# Patient Record
Sex: MALE | Race: WHITE | NOT HISPANIC OR LATINO | Employment: UNEMPLOYED | URBAN - METROPOLITAN AREA
[De-identification: names, ages, dates, MRNs, and addresses within clinical notes are randomized per-mention and may not be internally consistent; named-entity substitution may affect disease eponyms.]

---

## 2020-01-14 ENCOUNTER — HOSPITAL ENCOUNTER (EMERGENCY)
Facility: HOSPITAL | Age: 40
Discharge: HOME | End: 2020-01-15
Attending: EMERGENCY MEDICINE
Payer: MEDICAID

## 2020-01-14 DIAGNOSIS — F32.A DEPRESSION WITH SUICIDAL IDEATION: Primary | ICD-10-CM

## 2020-01-14 DIAGNOSIS — E16.2 HYPOGLYCEMIA: ICD-10-CM

## 2020-01-14 DIAGNOSIS — R45.851 DEPRESSION WITH SUICIDAL IDEATION: Primary | ICD-10-CM

## 2020-01-14 DIAGNOSIS — E86.0 DEHYDRATION: ICD-10-CM

## 2020-01-14 LAB
ALBUMIN SERPL BCP-MCNC: 4.3 G/DL (ref 3.5–5.2)
ALP SERPL-CCNC: 55 U/L (ref 55–135)
ALT SERPL W/O P-5'-P-CCNC: 28 U/L (ref 10–44)
AMPHET+METHAMPHET UR QL: NEGATIVE
ANION GAP SERPL CALC-SCNC: 12 MMOL/L (ref 8–16)
ANION GAP SERPL CALC-SCNC: 19 MMOL/L (ref 8–16)
APAP SERPL-MCNC: <3 UG/ML (ref 10–20)
AST SERPL-CCNC: 28 U/L (ref 10–40)
BARBITURATES UR QL SCN>200 NG/ML: NEGATIVE
BASOPHILS # BLD AUTO: 0.04 K/UL (ref 0–0.2)
BASOPHILS NFR BLD: 0.7 % (ref 0–1.9)
BENZODIAZ UR QL SCN>200 NG/ML: NEGATIVE
BILIRUB SERPL-MCNC: 0.9 MG/DL (ref 0.1–1)
BILIRUB UR QL STRIP: ABNORMAL
BUN SERPL-MCNC: 12 MG/DL (ref 6–20)
BUN SERPL-MCNC: 13 MG/DL (ref 6–20)
BZE UR QL SCN: NEGATIVE
CALCIUM SERPL-MCNC: 8.5 MG/DL (ref 8.7–10.5)
CALCIUM SERPL-MCNC: 9.5 MG/DL (ref 8.7–10.5)
CANNABINOIDS UR QL SCN: NEGATIVE
CHLORIDE SERPL-SCNC: 100 MMOL/L (ref 95–110)
CHLORIDE SERPL-SCNC: 106 MMOL/L (ref 95–110)
CLARITY UR: CLEAR
CO2 SERPL-SCNC: 16 MMOL/L (ref 23–29)
CO2 SERPL-SCNC: 21 MMOL/L (ref 23–29)
COLOR UR: YELLOW
CREAT SERPL-MCNC: 1 MG/DL (ref 0.5–1.4)
CREAT SERPL-MCNC: 1.2 MG/DL (ref 0.5–1.4)
CREAT UR-MCNC: 177.8 MG/DL (ref 23–375)
DIFFERENTIAL METHOD: ABNORMAL
EOSINOPHIL # BLD AUTO: 0.2 K/UL (ref 0–0.5)
EOSINOPHIL NFR BLD: 3.1 % (ref 0–8)
ERYTHROCYTE [DISTWIDTH] IN BLOOD BY AUTOMATED COUNT: 18.3 % (ref 11.5–14.5)
EST. GFR  (AFRICAN AMERICAN): >60 ML/MIN/1.73 M^2
EST. GFR  (AFRICAN AMERICAN): >60 ML/MIN/1.73 M^2
EST. GFR  (NON AFRICAN AMERICAN): >60 ML/MIN/1.73 M^2
EST. GFR  (NON AFRICAN AMERICAN): >60 ML/MIN/1.73 M^2
ETHANOL SERPL-MCNC: <10 MG/DL
GLUCOSE SERPL-MCNC: 57 MG/DL (ref 70–110)
GLUCOSE SERPL-MCNC: 72 MG/DL (ref 70–110)
GLUCOSE UR QL STRIP: NEGATIVE
HCT VFR BLD AUTO: 38.4 % (ref 40–54)
HGB BLD-MCNC: 10.9 G/DL (ref 14–18)
HGB UR QL STRIP: NEGATIVE
IMM GRANULOCYTES # BLD AUTO: 0.02 K/UL (ref 0–0.04)
IMM GRANULOCYTES NFR BLD AUTO: 0.4 % (ref 0–0.5)
KETONES UR QL STRIP: ABNORMAL
LEUKOCYTE ESTERASE UR QL STRIP: NEGATIVE
LYMPHOCYTES # BLD AUTO: 1.2 K/UL (ref 1–4.8)
LYMPHOCYTES NFR BLD: 22.6 % (ref 18–48)
MCH RBC QN AUTO: 21.4 PG (ref 27–31)
MCHC RBC AUTO-ENTMCNC: 28.4 G/DL (ref 32–36)
MCV RBC AUTO: 75 FL (ref 82–98)
METHADONE UR QL SCN>300 NG/ML: NEGATIVE
MONOCYTES # BLD AUTO: 0.4 K/UL (ref 0.3–1)
MONOCYTES NFR BLD: 7 % (ref 4–15)
NEUTROPHILS # BLD AUTO: 3.6 K/UL (ref 1.8–7.7)
NEUTROPHILS NFR BLD: 66.2 % (ref 38–73)
NITRITE UR QL STRIP: NEGATIVE
NRBC BLD-RTO: 0 /100 WBC
OPIATES UR QL SCN: NEGATIVE
PCP UR QL SCN>25 NG/ML: NEGATIVE
PH UR STRIP: 6 [PH] (ref 5–8)
PLATELET # BLD AUTO: 399 K/UL (ref 150–350)
PMV BLD AUTO: 9.1 FL (ref 9.2–12.9)
POTASSIUM SERPL-SCNC: 3.6 MMOL/L (ref 3.5–5.1)
POTASSIUM SERPL-SCNC: 3.6 MMOL/L (ref 3.5–5.1)
PROT SERPL-MCNC: 7.6 G/DL (ref 6–8.4)
PROT UR QL STRIP: NEGATIVE
RBC # BLD AUTO: 5.1 M/UL (ref 4.6–6.2)
SODIUM SERPL-SCNC: 135 MMOL/L (ref 136–145)
SODIUM SERPL-SCNC: 139 MMOL/L (ref 136–145)
SP GR UR STRIP: >=1.03 (ref 1–1.03)
TOXICOLOGY INFORMATION: NORMAL
TSH SERPL DL<=0.005 MIU/L-ACNC: 0.92 UIU/ML (ref 0.4–4)
URN SPEC COLLECT METH UR: ABNORMAL
UROBILINOGEN UR STRIP-ACNC: NEGATIVE EU/DL
WBC # BLD AUTO: 5.45 K/UL (ref 3.9–12.7)

## 2020-01-14 PROCEDURE — 96361 HYDRATE IV INFUSION ADD-ON: CPT

## 2020-01-14 PROCEDURE — 81003 URINALYSIS AUTO W/O SCOPE: CPT | Mod: 59

## 2020-01-14 PROCEDURE — 80329 ANALGESICS NON-OPIOID 1 OR 2: CPT

## 2020-01-14 PROCEDURE — 80053 COMPREHEN METABOLIC PANEL: CPT

## 2020-01-14 PROCEDURE — 80320 DRUG SCREEN QUANTALCOHOLS: CPT

## 2020-01-14 PROCEDURE — S4991 NICOTINE PATCH NONLEGEND: HCPCS | Performed by: EMERGENCY MEDICINE

## 2020-01-14 PROCEDURE — 96360 HYDRATION IV INFUSION INIT: CPT

## 2020-01-14 PROCEDURE — 82962 GLUCOSE BLOOD TEST: CPT

## 2020-01-14 PROCEDURE — 63600175 PHARM REV CODE 636 W HCPCS: Performed by: EMERGENCY MEDICINE

## 2020-01-14 PROCEDURE — 25000003 PHARM REV CODE 250: Performed by: EMERGENCY MEDICINE

## 2020-01-14 PROCEDURE — 80048 BASIC METABOLIC PNL TOTAL CA: CPT

## 2020-01-14 PROCEDURE — 80307 DRUG TEST PRSMV CHEM ANLYZR: CPT

## 2020-01-14 PROCEDURE — 84443 ASSAY THYROID STIM HORMONE: CPT

## 2020-01-14 PROCEDURE — 99285 EMERGENCY DEPT VISIT HI MDM: CPT | Mod: 25

## 2020-01-14 PROCEDURE — 85025 COMPLETE CBC W/AUTO DIFF WBC: CPT

## 2020-01-14 RX ORDER — QUETIAPINE FUMARATE 200 MG/1
200 TABLET, FILM COATED ORAL NIGHTLY
Status: ON HOLD | COMMUNITY
Start: 2019-10-07 | End: 2020-02-03 | Stop reason: HOSPADM

## 2020-01-14 RX ORDER — IBUPROFEN 200 MG
1 TABLET ORAL
Status: DISCONTINUED | OUTPATIENT
Start: 2020-01-14 | End: 2020-01-15 | Stop reason: HOSPADM

## 2020-01-14 RX ORDER — BUPROPION HYDROCHLORIDE 150 MG/1
150 TABLET ORAL DAILY
Status: ON HOLD | COMMUNITY
Start: 2019-10-07 | End: 2020-02-03 | Stop reason: HOSPADM

## 2020-01-14 RX ORDER — TRAZODONE HYDROCHLORIDE 100 MG/1
100 TABLET ORAL NIGHTLY
COMMUNITY
Start: 2019-10-07 | End: 2022-11-29

## 2020-01-14 RX ORDER — BUSPIRONE HYDROCHLORIDE 30 MG/1
30 TABLET ORAL 3 TIMES DAILY
Status: ON HOLD | COMMUNITY
Start: 2019-10-07 | End: 2020-02-03 | Stop reason: HOSPADM

## 2020-01-14 RX ORDER — DIPHENHYDRAMINE HCL 50 MG
50 CAPSULE ORAL
Status: COMPLETED | OUTPATIENT
Start: 2020-01-14 | End: 2020-01-14

## 2020-01-14 RX ORDER — GABAPENTIN 300 MG/1
300 CAPSULE ORAL DAILY
COMMUNITY
Start: 2019-10-07 | End: 2020-01-14

## 2020-01-14 RX ADMIN — SODIUM CHLORIDE 1000 ML: 0.9 INJECTION, SOLUTION INTRAVENOUS at 05:01

## 2020-01-14 RX ADMIN — NICOTINE 1 PATCH: 21 PATCH TRANSDERMAL at 07:01

## 2020-01-14 RX ADMIN — DIPHENHYDRAMINE HYDROCHLORIDE 50 MG: 50 CAPSULE ORAL at 10:01

## 2020-01-14 NOTE — ED NOTES
Patient belongings:    Bag 1: red phone , 2 lighters, pocket knife (security aware), portable , black wallet, gray pants, brown shoes, tennis shoes red jacket  Patient belongings in [facility] locker 28

## 2020-01-14 NOTE — ED NOTES
Pt c/o SI/HI related auditory hallucinations x4-5 days.    Patient identifiers verified and correct for Adal Bower.    LOC: The patient is awake, alert and aware of environment with an appropriate affect, the patient is oriented x 3 and speaking appropriately.  APPEARANCE: Patient resting comfortably and in no acute distress, patient is clean and well groomed, patient's clothing is properly fastened.  SKIN: The skin is warm and dry, color consistent with ethnicity, patient has normal skin turgor and moist mucus membranes, skin intact, no breakdown or bruising noted. Significant scaring noted to bilateral upper exremities  MUSCULOSKELETAL: Patient moving all extremities spontaneously.  RESPIRATORY: Airway is open and patent, respirations are spontaneous.  CARDIAC: Patient has a normal rate, no peripheral edema noted, capillary refill < 3 seconds.  ABDOMEN: Soft and non tender to palpation.

## 2020-01-14 NOTE — ED NOTES
Pt reports he attempted suicide back in October by slitting his wrist. Pt is open to discussing his situation - pt reports he has been sleeping in a tent for the last 2 years. Pt is calm and cooperative at this time.

## 2020-01-15 VITALS
OXYGEN SATURATION: 98 % | TEMPERATURE: 98 F | WEIGHT: 165 LBS | RESPIRATION RATE: 18 BRPM | SYSTOLIC BLOOD PRESSURE: 127 MMHG | DIASTOLIC BLOOD PRESSURE: 75 MMHG | HEIGHT: 72 IN | BODY MASS INDEX: 22.35 KG/M2 | HEART RATE: 75 BPM

## 2020-01-15 LAB — POCT GLUCOSE: 85 MG/DL (ref 70–110)

## 2020-01-15 NOTE — ED NOTES
Pt resting in bed. No acute distress. RR equal and non-labored, VSS. Bed in low and locked position. Pt's room secured per protocol. Pt's belongings secured and pt placed in grey gown and yellow socks.  Pt being directly monitored by elsa Snowden at this time.     Will continue to monitor

## 2020-01-15 NOTE — ED PROVIDER NOTES
"SCRIBE #1 NOTE: I, Yo Gonzales, am scribing for, and in the presence of, Christophe Aguilar Do, MD. I have scribed the entire note.       History     Chief Complaint   Patient presents with    Psychiatric Evaluation     Si and auditory hallucination     Review of patient's allergies indicates:  No Known Allergies      History of Present Illness     HPI    1/14/2020, 6:09 PM  History obtained from the patient      History of Present Illness: Adal Bower is a 39 y.o. male patient with a PMHx of anxiety and depression who presents to the Emergency Department for evaluation of psychiatric evaluation. Pt reports having a recent increase in stress as his friend is betraying him. Pt admits to HI towards his friend. Pt also admits to SI, with a plan of cutting himself. Pt states that he lives in the backyard of a friends home. Pt was d/c from a psychiatric facility on 1/6/20. Pt states "I can't think straight because of lack of nutrition, lack of food, and lack of sleep". Symptoms are constant and moderate in severity. No mitigating or exacerbating factors reported. Patient denies any IV drug use, abd pain, n/v/d, fever, chills, alcohol use, weakness, cough, CP, SOB, HA, dizziness, auditory/visual hallucinations, and all other sxs at this time. No prior Tx reported. No further complaints or concerns at this time.         Arrival mode: EMS      PCP: Primary Doctor No        Past Medical History:  Past Medical History:   Diagnosis Date    Anxiety     Depression        Past Surgical History:  History reviewed. No pertinent surgical history.      Family History:  History reviewed. No pertinent family history.    Social History:  Social History     Tobacco Use    Smoking status: Current Every Day Smoker     Packs/day: 0.50   Substance and Sexual Activity    Alcohol use: Not Currently    Drug use: Yes     Types: Marijuana    Sexual activity: Unknown        Review of Systems     Review of Systems "   Constitutional: Negative for chills and fever.        (-) IV drug use  (-) alcohol use   HENT: Negative for sore throat.    Respiratory: Negative for cough and shortness of breath.    Cardiovascular: Negative for chest pain.   Gastrointestinal: Negative for abdominal pain, diarrhea, nausea and vomiting.   Genitourinary: Negative for dysuria.   Musculoskeletal: Negative for back pain.   Skin: Negative for rash.   Neurological: Negative for dizziness, weakness and headaches.   Hematological: Does not bruise/bleed easily.   Psychiatric/Behavioral: Positive for sleep disturbance and suicidal ideas. Negative for hallucinations (auditory/visual).        (+) homicidal ideation   All other systems reviewed and are negative.     Physical Exam     Initial Vitals [01/14/20 1607]   BP Pulse Resp Temp SpO2   130/77 83 18 98.2 °F (36.8 °C) 99 %      MAP       --          Physical Exam  Nursing Notes and Vital Signs Reviewed.  Constitutional: Patient is in no apparent distress. Well-developed and well-nourished.  Head: Atraumatic. Normocephalic.  Eyes: PERRL. EOM intact. Conjunctivae are not pale. No scleral icterus.  ENT: Mucous membranes are moist. Oropharynx is clear and symmetric.    Neck: Supple. Full ROM. No lymphadenopathy.  Cardiovascular: Regular rate. Regular rhythm. No murmurs, rubs, or gallops. Distal pulses are 2+ and symmetric.  Pulmonary/Chest: No respiratory distress. Clear to auscultation bilaterally. No wheezing or rales.  Abdominal: Soft and non-distended.  There is no tenderness.  No rebound, guarding, or rigidity. Good bowel sounds.  Musculoskeletal: Moves all extremities. No obvious deformities. No edema.   Skin: Warm and dry.  Neurological:  Alert, awake, and appropriate.  Normal speech.  No acute focal neurological deficits are appreciated.  Psychiatric:               Behavior: psychomotor agitation              Mood and Affect: flat affect              Thought Process: scattered              Suicidal  Ideations: Yes              Suicidal Plan: Specific plan to harm self.              Homicidal Ideations: Yes              Hallucinations: none       ED Course   Procedures  ED Vital Signs:  Vitals:    01/14/20 1607 01/14/20 1948   BP: 130/77 119/64   Pulse: 83 84   Resp: 18 18   Temp: 98.2 °F (36.8 °C) 98 °F (36.7 °C)   TempSrc: Oral Oral   SpO2: 99% 99%   Weight: 74.8 kg (165 lb)    Height: 6' (1.829 m)        Abnormal Lab Results:  Labs Reviewed   CBC W/ AUTO DIFFERENTIAL - Abnormal; Notable for the following components:       Result Value    Hemoglobin 10.9 (*)     Hematocrit 38.4 (*)     Mean Corpuscular Volume 75 (*)     Mean Corpuscular Hemoglobin 21.4 (*)     Mean Corpuscular Hemoglobin Conc 28.4 (*)     RDW 18.3 (*)     Platelets 399 (*)     MPV 9.1 (*)     All other components within normal limits   COMPREHENSIVE METABOLIC PANEL - Abnormal; Notable for the following components:    Sodium 135 (*)     CO2 16 (*)     Glucose 57 (*)     Anion Gap 19 (*)     All other components within normal limits   URINALYSIS, REFLEX TO URINE CULTURE - Abnormal; Notable for the following components:    Specific Gravity, UA >=1.030 (*)     Ketones, UA 3+ (*)     Bilirubin (UA) 1+ (*)     All other components within normal limits    Narrative:     Preferred Collection Type->Urine, Clean Catch   ACETAMINOPHEN LEVEL - Abnormal; Notable for the following components:    Acetaminophen (Tylenol), Serum <3.0 (*)     All other components within normal limits   BASIC METABOLIC PANEL - Abnormal; Notable for the following components:    CO2 21 (*)     Calcium 8.5 (*)     All other components within normal limits    Narrative:     After fluids complete   TSH   DRUG SCREEN PANEL, URINE EMERGENCY    Narrative:     Preferred Collection Type->Urine, Clean Catch   ALCOHOL,MEDICAL (ETHANOL)        All Lab Results:  Results for orders placed or performed during the hospital encounter of 01/14/20   CBC auto differential   Result Value Ref Range     WBC 5.45 3.90 - 12.70 K/uL    RBC 5.10 4.60 - 6.20 M/uL    Hemoglobin 10.9 (L) 14.0 - 18.0 g/dL    Hematocrit 38.4 (L) 40.0 - 54.0 %    Mean Corpuscular Volume 75 (L) 82 - 98 fL    Mean Corpuscular Hemoglobin 21.4 (L) 27.0 - 31.0 pg    Mean Corpuscular Hemoglobin Conc 28.4 (L) 32.0 - 36.0 g/dL    RDW 18.3 (H) 11.5 - 14.5 %    Platelets 399 (H) 150 - 350 K/uL    MPV 9.1 (L) 9.2 - 12.9 fL    Immature Granulocytes 0.4 0.0 - 0.5 %    Gran # (ANC) 3.6 1.8 - 7.7 K/uL    Immature Grans (Abs) 0.02 0.00 - 0.04 K/uL    Lymph # 1.2 1.0 - 4.8 K/uL    Mono # 0.4 0.3 - 1.0 K/uL    Eos # 0.2 0.0 - 0.5 K/uL    Baso # 0.04 0.00 - 0.20 K/uL    nRBC 0 0 /100 WBC    Gran% 66.2 38.0 - 73.0 %    Lymph% 22.6 18.0 - 48.0 %    Mono% 7.0 4.0 - 15.0 %    Eosinophil% 3.1 0.0 - 8.0 %    Basophil% 0.7 0.0 - 1.9 %    Differential Method Automated    Comprehensive metabolic panel   Result Value Ref Range    Sodium 135 (L) 136 - 145 mmol/L    Potassium 3.6 3.5 - 5.1 mmol/L    Chloride 100 95 - 110 mmol/L    CO2 16 (L) 23 - 29 mmol/L    Glucose 57 (L) 70 - 110 mg/dL    BUN, Bld 12 6 - 20 mg/dL    Creatinine 1.2 0.5 - 1.4 mg/dL    Calcium 9.5 8.7 - 10.5 mg/dL    Total Protein 7.6 6.0 - 8.4 g/dL    Albumin 4.3 3.5 - 5.2 g/dL    Total Bilirubin 0.9 0.1 - 1.0 mg/dL    Alkaline Phosphatase 55 55 - 135 U/L    AST 28 10 - 40 U/L    ALT 28 10 - 44 U/L    Anion Gap 19 (H) 8 - 16 mmol/L    eGFR if African American >60 >60 mL/min/1.73 m^2    eGFR if non African American >60 >60 mL/min/1.73 m^2   TSH   Result Value Ref Range    TSH 0.917 0.400 - 4.000 uIU/mL   Urinalysis, Reflex to Urine Culture Urine, Clean Catch   Result Value Ref Range    Specimen UA Urine, Clean Catch     Color, UA Yellow Yellow, Straw, Paulina    Appearance, UA Clear Clear    pH, UA 6.0 5.0 - 8.0    Specific Gravity, UA >=1.030 (A) 1.005 - 1.030    Protein, UA Negative Negative    Glucose, UA Negative Negative    Ketones, UA 3+ (A) Negative    Bilirubin (UA) 1+ (A) Negative    Occult Blood  UA Negative Negative    Nitrite, UA Negative Negative    Urobilinogen, UA Negative <2.0 EU/dL    Leukocytes, UA Negative Negative   Drug screen panel, emergency   Result Value Ref Range    Benzodiazepines Negative     Methadone metabolites Negative     Cocaine (Metab.) Negative     Opiate Scrn, Ur Negative     Barbiturate Screen, Ur Negative     Amphetamine Screen, Ur Negative     THC Negative     Phencyclidine Negative     Creatinine, Random Ur 177.8 23.0 - 375.0 mg/dL    Toxicology Information SEE COMMENT    Ethanol   Result Value Ref Range    Alcohol, Medical, Serum <10 <10 mg/dL   Acetaminophen level   Result Value Ref Range    Acetaminophen (Tylenol), Serum <3.0 (L) 10.0 - 20.0 ug/mL   Basic metabolic panel   Result Value Ref Range    Sodium 139 136 - 145 mmol/L    Potassium 3.6 3.5 - 5.1 mmol/L    Chloride 106 95 - 110 mmol/L    CO2 21 (L) 23 - 29 mmol/L    Glucose 72 70 - 110 mg/dL    BUN, Bld 13 6 - 20 mg/dL    Creatinine 1.0 0.5 - 1.4 mg/dL    Calcium 8.5 (L) 8.7 - 10.5 mg/dL    Anion Gap 12 8 - 16 mmol/L    eGFR if African American >60 >60 mL/min/1.73 m^2    eGFR if non African American >60 >60 mL/min/1.73 m^2         Imaging Results:  Imaging Results    None                   The Emergency Provider reviewed the vital signs and test results, which are outlined above.     ED Discussion   6:20 PM: The [facility] hold has been issued by Dr. Peña at this time for SI. Initial glucose very low and pt ate in ER.  Also received 2 liters of fluids for low CO2. Suspect he was dehydrated.  Repeat was almost normal.      8:38 PM: Pt has been medically cleared by Dr. Peña at this time. Reassessed pt at this time. Pt is resting comfortably and appears in no acute distress. There are no psychiatric services offered at this facility. D/w pt all pertinent ED information and plan to transfer to psychiatric facility for psychiatric treatment. Pt verbalizes understanding. Patient being transferred by [facility] for ongoing personal  protection en route. Pt has been made aware of all risks and benefits associated with transfer, including but not limited to death, MVC, loss of vital signs, and/or permanent disability. Benefits include ability to be treated at an inpatient psychiatric facility. Pt will be transported by personnel trained in CPR and CPI. Patient understands that there could be unforeseen motor vehicle accidents, inclement weather, or loss of vital signs that could result in potential death or permanent disability. All questions and complaints have been addressed at this time. Pt condition is stable at this time and is clear to transfer to psychiatric facility at this time.     11:51 PM: Consult with Dr. Ontiveros (Psychiatry) at [facility] concerning pt. There are no psychiatric services, which the patient requires, offered at [facility] at this time. Dr. Ontiveros expresses understanding and will accept transfer for SI.  Accepting Facility: [facility]  Accepting Physician: Dr. Ontiveros    11:52 PM: Re-evaluated pt. Informed pt and family that there are no psychiatric services available at this time. I have discussed test results, shared treatment plan, and the need for transfer with patient and family at bedside. All historical, clinical, radiographic, and laboratory findings were reviewed with the patient/family in detail. Patient will be transferred by [facility] services with care required en route. Patient understands that there could be unforeseen motor vehicle accidents or loss of vital signs that could result in potential death or permanent disability. Pt and family express understanding at this time and agree with all information. All questions answered. Pt and family have no further questions or concerns at this time. Pt is ready for transfer.            Medical Decision Making:   Clinical Tests:   Lab Tests: Ordered and Reviewed     Additional MDM:   Smoking Cessation: The patient was referred to a tobacco  treatment program. The patient was counseled on the adverse effects of second hand smoke. Appropriate patient literature was given to the patient concerning tobacco cessation. The patient was counseled on how exercise will aide in tobacco cessation. The patient was counseled on tobacco related  health complications. The patient was counseled on hazards of smoking while driving.        ED Medication(s):  Medications   nicotine 21 mg/24 hr 1 patch (1 patch Transdermal Patch Applied 1/14/20 1925)   sodium chloride 0.9% bolus 1,000 mL (0 mLs Intravenous Stopped 1/14/20 1929)   sodium chloride 0.9% bolus 1,000 mL (0 mLs Intravenous Stopped 1/14/20 1929)   diphenhydrAMINE capsule 50 mg (50 mg Oral Given 1/14/20 2244)       New Prescriptions    No medications on file               Scribe Attestation:   Scribe #1: I performed the above scribed service and the documentation accurately describes the services I performed. I attest to the accuracy of the note.     Attending:   Physician Attestation Statement for Scribe #1: I, Christophe Aguilar Do, MD, personally performed the services described in this documentation, as scribed by Yo Gonzales, in my presence, and it is both accurate and complete.           Clinical Impression       ICD-10-CM ICD-9-CM   1. Depression with suicidal ideation F32.9 311    R45.851 V62.84       Disposition:   Disposition: Transferred  Condition: Fair         Christophe Aguilar Do, MD  01/15/20 0132       Christophe Aguilar Do, MD  01/15/20 0133

## 2020-01-15 NOTE — ED NOTES
Patient lying in bed. NAD noted. RR even and unlabored. Pt oriented x 4. Pt remains in gray gown/yellow socks. Pt remains free of all harmful objects. pts belongings obtained by previous RN and listed in chart, labeled, and secured in cabinet 28. Pts room remains cleared per P[facility] protocol/safe environment checklist. Suicide risk assessment completed. Toilet/food offered. Reports he is feeling better after receiving IV fluids. He states he is no longer feeling like he is hallucinating.elsa Snowden, remains at bedside performing direct observation. Pt denies any needs at this time. Bed in low locked position, side rails up x 2, pt instructed to ask sitter if assistance is needed, will continue to monitor.

## 2020-01-25 ENCOUNTER — HOSPITAL ENCOUNTER (OUTPATIENT)
Facility: HOSPITAL | Age: 40
Discharge: PSYCHIATRIC OR SUBSTANCE USE FACILITY | End: 2020-01-26
Attending: EMERGENCY MEDICINE | Admitting: INTERNAL MEDICINE
Payer: MEDICAID

## 2020-01-25 DIAGNOSIS — F10.939 ALCOHOL WITHDRAWAL SYNDROME WITH COMPLICATION: ICD-10-CM

## 2020-01-25 DIAGNOSIS — E87.20 LACTIC ACIDOSIS: ICD-10-CM

## 2020-01-25 DIAGNOSIS — E86.1 INTRAVASCULAR VOLUME DEPLETION: ICD-10-CM

## 2020-01-25 DIAGNOSIS — R00.0 TACHYCARDIA: ICD-10-CM

## 2020-01-25 DIAGNOSIS — R50.9 FEVER: Primary | ICD-10-CM

## 2020-01-25 DIAGNOSIS — R45.851 SUICIDAL IDEATION: ICD-10-CM

## 2020-01-25 LAB
ALBUMIN SERPL BCP-MCNC: 4.1 G/DL (ref 3.5–5.2)
ALP SERPL-CCNC: 59 U/L (ref 55–135)
ALT SERPL W/O P-5'-P-CCNC: 37 U/L (ref 10–44)
ANION GAP SERPL CALC-SCNC: 14 MMOL/L (ref 8–16)
AST SERPL-CCNC: 41 U/L (ref 10–40)
BASOPHILS # BLD AUTO: 0.01 K/UL (ref 0–0.2)
BASOPHILS NFR BLD: 0.2 % (ref 0–1.9)
BILIRUB SERPL-MCNC: 0.6 MG/DL (ref 0.1–1)
BILIRUB UR QL STRIP: NEGATIVE
BUN SERPL-MCNC: 15 MG/DL (ref 6–20)
CALCIUM SERPL-MCNC: 9.3 MG/DL (ref 8.7–10.5)
CHLORIDE SERPL-SCNC: 100 MMOL/L (ref 95–110)
CLARITY UR: CLEAR
CO2 SERPL-SCNC: 23 MMOL/L (ref 23–29)
COLOR UR: YELLOW
CREAT SERPL-MCNC: 1 MG/DL (ref 0.5–1.4)
DIFFERENTIAL METHOD: ABNORMAL
EOSINOPHIL # BLD AUTO: 0 K/UL (ref 0–0.5)
EOSINOPHIL NFR BLD: 0.2 % (ref 0–8)
ERYTHROCYTE [DISTWIDTH] IN BLOOD BY AUTOMATED COUNT: 22.7 % (ref 11.5–14.5)
EST. GFR  (AFRICAN AMERICAN): >60 ML/MIN/1.73 M^2
EST. GFR  (NON AFRICAN AMERICAN): >60 ML/MIN/1.73 M^2
GLUCOSE SERPL-MCNC: 108 MG/DL (ref 70–110)
GLUCOSE UR QL STRIP: NEGATIVE
HCT VFR BLD AUTO: 34.4 % (ref 40–54)
HGB BLD-MCNC: 10.3 G/DL (ref 14–18)
HGB UR QL STRIP: NEGATIVE
HIV 1+2 AB+HIV1 P24 AG SERPL QL IA: NEGATIVE
IMM GRANULOCYTES # BLD AUTO: 0.02 K/UL (ref 0–0.04)
IMM GRANULOCYTES NFR BLD AUTO: 0.3 % (ref 0–0.5)
INFLUENZA A, MOLECULAR: NEGATIVE
INFLUENZA B, MOLECULAR: NEGATIVE
KETONES UR QL STRIP: NEGATIVE
LACTATE SERPL-SCNC: 3.1 MMOL/L (ref 0.5–2.2)
LEUKOCYTE ESTERASE UR QL STRIP: NEGATIVE
LYMPHOCYTES # BLD AUTO: 0.4 K/UL (ref 1–4.8)
LYMPHOCYTES NFR BLD: 5.8 % (ref 18–48)
MCH RBC QN AUTO: 22.3 PG (ref 27–31)
MCHC RBC AUTO-ENTMCNC: 29.9 G/DL (ref 32–36)
MCV RBC AUTO: 75 FL (ref 82–98)
MONOCYTES # BLD AUTO: 0.5 K/UL (ref 0.3–1)
MONOCYTES NFR BLD: 8.1 % (ref 4–15)
NEUTROPHILS # BLD AUTO: 5.2 K/UL (ref 1.8–7.7)
NEUTROPHILS NFR BLD: 85.4 % (ref 38–73)
NITRITE UR QL STRIP: NEGATIVE
NRBC BLD-RTO: 0 /100 WBC
PH UR STRIP: 6 [PH] (ref 5–8)
PLATELET # BLD AUTO: 248 K/UL (ref 150–350)
PMV BLD AUTO: 8.9 FL (ref 9.2–12.9)
POTASSIUM SERPL-SCNC: 3.4 MMOL/L (ref 3.5–5.1)
PROCALCITONIN SERPL IA-MCNC: 0.06 NG/ML
PROT SERPL-MCNC: 7.1 G/DL (ref 6–8.4)
PROT UR QL STRIP: ABNORMAL
RBC # BLD AUTO: 4.61 M/UL (ref 4.6–6.2)
SODIUM SERPL-SCNC: 137 MMOL/L (ref 136–145)
SP GR UR STRIP: >=1.03 (ref 1–1.03)
SPECIMEN SOURCE: NORMAL
TROPONIN I SERPL DL<=0.01 NG/ML-MCNC: 0.01 NG/ML (ref 0–0.03)
URN SPEC COLLECT METH UR: ABNORMAL
UROBILINOGEN UR STRIP-ACNC: NEGATIVE EU/DL
WBC # BLD AUTO: 6.08 K/UL (ref 3.9–12.7)

## 2020-01-25 PROCEDURE — 86308 HETEROPHILE ANTIBODY SCREEN: CPT

## 2020-01-25 PROCEDURE — 80053 COMPREHEN METABOLIC PANEL: CPT

## 2020-01-25 PROCEDURE — 93010 ELECTROCARDIOGRAM REPORT: CPT | Mod: ,,, | Performed by: INTERNAL MEDICINE

## 2020-01-25 PROCEDURE — 36415 COLL VENOUS BLD VENIPUNCTURE: CPT

## 2020-01-25 PROCEDURE — 93010 EKG 12-LEAD: ICD-10-PCS | Mod: ,,, | Performed by: INTERNAL MEDICINE

## 2020-01-25 PROCEDURE — 25000242 PHARM REV CODE 250 ALT 637 W/ HCPCS: Performed by: EMERGENCY MEDICINE

## 2020-01-25 PROCEDURE — 84484 ASSAY OF TROPONIN QUANT: CPT

## 2020-01-25 PROCEDURE — 99291 CRITICAL CARE FIRST HOUR: CPT | Mod: 25

## 2020-01-25 PROCEDURE — 25000003 PHARM REV CODE 250: Performed by: EMERGENCY MEDICINE

## 2020-01-25 PROCEDURE — 96375 TX/PRO/DX INJ NEW DRUG ADDON: CPT

## 2020-01-25 PROCEDURE — 85025 COMPLETE CBC W/AUTO DIFF WBC: CPT

## 2020-01-25 PROCEDURE — 81003 URINALYSIS AUTO W/O SCOPE: CPT

## 2020-01-25 PROCEDURE — 86703 HIV-1/HIV-2 1 RESULT ANTBDY: CPT

## 2020-01-25 PROCEDURE — 93005 ELECTROCARDIOGRAM TRACING: CPT

## 2020-01-25 PROCEDURE — 87502 INFLUENZA DNA AMP PROBE: CPT

## 2020-01-25 PROCEDURE — 83605 ASSAY OF LACTIC ACID: CPT

## 2020-01-25 PROCEDURE — 94640 AIRWAY INHALATION TREATMENT: CPT

## 2020-01-25 PROCEDURE — 96361 HYDRATE IV INFUSION ADD-ON: CPT

## 2020-01-25 PROCEDURE — 84145 PROCALCITONIN (PCT): CPT

## 2020-01-25 PROCEDURE — 87040 BLOOD CULTURE FOR BACTERIA: CPT | Mod: 59

## 2020-01-25 PROCEDURE — 63600175 PHARM REV CODE 636 W HCPCS: Performed by: EMERGENCY MEDICINE

## 2020-01-25 RX ORDER — KETOROLAC TROMETHAMINE 30 MG/ML
15 INJECTION, SOLUTION INTRAMUSCULAR; INTRAVENOUS
Status: COMPLETED | OUTPATIENT
Start: 2020-01-25 | End: 2020-01-25

## 2020-01-25 RX ORDER — ACETAMINOPHEN 500 MG
1000 TABLET ORAL
Status: COMPLETED | OUTPATIENT
Start: 2020-01-25 | End: 2020-01-25

## 2020-01-25 RX ORDER — IPRATROPIUM BROMIDE AND ALBUTEROL SULFATE 2.5; .5 MG/3ML; MG/3ML
3 SOLUTION RESPIRATORY (INHALATION)
Status: COMPLETED | OUTPATIENT
Start: 2020-01-25 | End: 2020-01-25

## 2020-01-25 RX ADMIN — ACETAMINOPHEN 1000 MG: 500 TABLET ORAL at 09:01

## 2020-01-25 RX ADMIN — SODIUM CHLORIDE, SODIUM LACTATE, POTASSIUM CHLORIDE, AND CALCIUM CHLORIDE 2154 ML: .6; .31; .03; .02 INJECTION, SOLUTION INTRAVENOUS at 09:01

## 2020-01-25 RX ADMIN — KETOROLAC TROMETHAMINE 15 MG: 30 INJECTION, SOLUTION INTRAMUSCULAR at 09:01

## 2020-01-25 RX ADMIN — LORAZEPAM 1 MG: 2 INJECTION INTRAMUSCULAR; INTRAVENOUS at 09:01

## 2020-01-25 RX ADMIN — IPRATROPIUM BROMIDE AND ALBUTEROL SULFATE 3 ML: .5; 3 SOLUTION RESPIRATORY (INHALATION) at 09:01

## 2020-01-26 ENCOUNTER — HOSPITAL ENCOUNTER (EMERGENCY)
Facility: HOSPITAL | Age: 40
Discharge: HOME | End: 2020-01-26
Attending: EMERGENCY MEDICINE
Payer: MEDICAID

## 2020-01-26 VITALS
DIASTOLIC BLOOD PRESSURE: 73 MMHG | SYSTOLIC BLOOD PRESSURE: 138 MMHG | WEIGHT: 158.19 LBS | TEMPERATURE: 98 F | HEART RATE: 97 BPM | RESPIRATION RATE: 18 BRPM | BODY MASS INDEX: 21.42 KG/M2 | HEIGHT: 72 IN | OXYGEN SATURATION: 97 %

## 2020-01-26 VITALS
HEART RATE: 104 BPM | WEIGHT: 190 LBS | DIASTOLIC BLOOD PRESSURE: 83 MMHG | TEMPERATURE: 99 F | BODY MASS INDEX: 25.73 KG/M2 | OXYGEN SATURATION: 100 % | RESPIRATION RATE: 20 BRPM | SYSTOLIC BLOOD PRESSURE: 139 MMHG | HEIGHT: 72 IN

## 2020-01-26 DIAGNOSIS — J20.9 ACUTE BRONCHITIS, UNSPECIFIED ORGANISM: Primary | ICD-10-CM

## 2020-01-26 DIAGNOSIS — J45.901 EXACERBATION OF ASTHMA, UNSPECIFIED ASTHMA SEVERITY, UNSPECIFIED WHETHER PERSISTENT: ICD-10-CM

## 2020-01-26 PROBLEM — F10.10 ALCOHOL ABUSE: Status: ACTIVE | Noted: 2020-01-26

## 2020-01-26 PROBLEM — R45.851 SUICIDAL IDEATION: Status: ACTIVE | Noted: 2020-01-26

## 2020-01-26 PROBLEM — J45.909 ASTHMA: Status: ACTIVE | Noted: 2020-01-26

## 2020-01-26 PROBLEM — R50.9 FEVER: Status: ACTIVE | Noted: 2020-01-26

## 2020-01-26 PROBLEM — F32.A DEPRESSION: Status: ACTIVE | Noted: 2020-01-26

## 2020-01-26 PROBLEM — E87.20 LACTIC ACIDOSIS: Status: ACTIVE | Noted: 2020-01-26

## 2020-01-26 PROBLEM — R45.851 SUICIDE IDEATION: Status: ACTIVE | Noted: 2020-01-26

## 2020-01-26 LAB
ALBUMIN SERPL BCP-MCNC: 3.4 G/DL (ref 3.5–5.2)
ALP SERPL-CCNC: 52 U/L (ref 55–135)
ALT SERPL W/O P-5'-P-CCNC: 30 U/L (ref 10–44)
AMPHET+METHAMPHET UR QL: NEGATIVE
ANION GAP SERPL CALC-SCNC: 10 MMOL/L (ref 8–16)
ANISOCYTOSIS BLD QL SMEAR: SLIGHT
AST SERPL-CCNC: 30 U/L (ref 10–40)
BARBITURATES UR QL SCN>200 NG/ML: NEGATIVE
BASOPHILS # BLD AUTO: 0 K/UL (ref 0–0.2)
BASOPHILS NFR BLD: 0 % (ref 0–1.9)
BENZODIAZ UR QL SCN>200 NG/ML: NEGATIVE
BILIRUB SERPL-MCNC: 0.3 MG/DL (ref 0.1–1)
BUN SERPL-MCNC: 13 MG/DL (ref 6–20)
BZE UR QL SCN: NEGATIVE
CALCIUM SERPL-MCNC: 9 MG/DL (ref 8.7–10.5)
CANNABINOIDS UR QL SCN: NEGATIVE
CHLORIDE SERPL-SCNC: 106 MMOL/L (ref 95–110)
CO2 SERPL-SCNC: 24 MMOL/L (ref 23–29)
CREAT SERPL-MCNC: 0.9 MG/DL (ref 0.5–1.4)
CREAT UR-MCNC: 34.5 MG/DL (ref 23–375)
DACRYOCYTES BLD QL SMEAR: ABNORMAL
DIFFERENTIAL METHOD: ABNORMAL
EOSINOPHIL # BLD AUTO: 0 K/UL (ref 0–0.5)
EOSINOPHIL NFR BLD: 0 % (ref 0–8)
ERYTHROCYTE [DISTWIDTH] IN BLOOD BY AUTOMATED COUNT: 22.8 % (ref 11.5–14.5)
EST. GFR  (AFRICAN AMERICAN): >60 ML/MIN/1.73 M^2
EST. GFR  (NON AFRICAN AMERICAN): >60 ML/MIN/1.73 M^2
GLUCOSE SERPL-MCNC: 157 MG/DL (ref 70–110)
HCT VFR BLD AUTO: 32.5 % (ref 40–54)
HGB BLD-MCNC: 9.6 G/DL (ref 14–18)
HYPOCHROMIA BLD QL SMEAR: ABNORMAL
IMM GRANULOCYTES # BLD AUTO: 0.03 K/UL (ref 0–0.04)
IMM GRANULOCYTES NFR BLD AUTO: 0.4 % (ref 0–0.5)
LACTATE SERPL-SCNC: 2.1 MMOL/L (ref 0.5–2.2)
LACTATE SERPL-SCNC: 5.2 MMOL/L (ref 0.5–2.2)
LYMPHOCYTES # BLD AUTO: 0.3 K/UL (ref 1–4.8)
LYMPHOCYTES NFR BLD: 4.5 % (ref 18–48)
MAGNESIUM SERPL-MCNC: 1.9 MG/DL (ref 1.6–2.6)
MCH RBC QN AUTO: 22.6 PG (ref 27–31)
MCHC RBC AUTO-ENTMCNC: 29.5 G/DL (ref 32–36)
MCV RBC AUTO: 77 FL (ref 82–98)
METHADONE UR QL SCN>300 NG/ML: NEGATIVE
MONOCYTES # BLD AUTO: 0.3 K/UL (ref 0.3–1)
MONOCYTES NFR BLD: 4 % (ref 4–15)
NEUTROPHILS # BLD AUTO: 6.9 K/UL (ref 1.8–7.7)
NEUTROPHILS NFR BLD: 91.1 % (ref 38–73)
NRBC BLD-RTO: 0 /100 WBC
OPIATES UR QL SCN: NEGATIVE
OVALOCYTES BLD QL SMEAR: ABNORMAL
PCP UR QL SCN>25 NG/ML: NEGATIVE
PHOSPHATE SERPL-MCNC: 2.3 MG/DL (ref 2.7–4.5)
PLATELET # BLD AUTO: 235 K/UL (ref 150–350)
PLATELET BLD QL SMEAR: ABNORMAL
PMV BLD AUTO: 9 FL (ref 9.2–12.9)
POIKILOCYTOSIS BLD QL SMEAR: SLIGHT
POLYCHROMASIA BLD QL SMEAR: ABNORMAL
POTASSIUM SERPL-SCNC: 4 MMOL/L (ref 3.5–5.1)
PROT SERPL-MCNC: 6.3 G/DL (ref 6–8.4)
RBC # BLD AUTO: 4.25 M/UL (ref 4.6–6.2)
SODIUM SERPL-SCNC: 140 MMOL/L (ref 136–145)
TOXICOLOGY INFORMATION: NORMAL
WBC # BLD AUTO: 7.52 K/UL (ref 3.9–12.7)

## 2020-01-26 PROCEDURE — G0378 HOSPITAL OBSERVATION PER HR: HCPCS

## 2020-01-26 PROCEDURE — 94640 AIRWAY INHALATION TREATMENT: CPT | Mod: ER

## 2020-01-26 PROCEDURE — 94760 N-INVAS EAR/PLS OXIMETRY 1: CPT | Mod: ER

## 2020-01-26 PROCEDURE — 83735 ASSAY OF MAGNESIUM: CPT

## 2020-01-26 PROCEDURE — 25000242 PHARM REV CODE 250 ALT 637 W/ HCPCS: Performed by: NURSE PRACTITIONER

## 2020-01-26 PROCEDURE — 25000242 PHARM REV CODE 250 ALT 637 W/ HCPCS: Mod: ER | Performed by: EMERGENCY MEDICINE

## 2020-01-26 PROCEDURE — 99285 EMERGENCY DEPT VISIT HI MDM: CPT | Mod: 25,ER

## 2020-01-26 PROCEDURE — 80053 COMPREHEN METABOLIC PANEL: CPT

## 2020-01-26 PROCEDURE — 83605 ASSAY OF LACTIC ACID: CPT

## 2020-01-26 PROCEDURE — 96375 TX/PRO/DX INJ NEW DRUG ADDON: CPT

## 2020-01-26 PROCEDURE — 85025 COMPLETE CBC W/AUTO DIFF WBC: CPT

## 2020-01-26 PROCEDURE — 96365 THER/PROPH/DIAG IV INF INIT: CPT

## 2020-01-26 PROCEDURE — 25000003 PHARM REV CODE 250: Performed by: EMERGENCY MEDICINE

## 2020-01-26 PROCEDURE — 96366 THER/PROPH/DIAG IV INF ADDON: CPT

## 2020-01-26 PROCEDURE — 63600175 PHARM REV CODE 636 W HCPCS: Performed by: NURSE PRACTITIONER

## 2020-01-26 PROCEDURE — 96361 HYDRATE IV INFUSION ADD-ON: CPT

## 2020-01-26 PROCEDURE — 83605 ASSAY OF LACTIC ACID: CPT | Mod: 91

## 2020-01-26 PROCEDURE — 80307 DRUG TEST PRSMV CHEM ANLYZR: CPT

## 2020-01-26 PROCEDURE — 63600175 PHARM REV CODE 636 W HCPCS: Mod: ER | Performed by: EMERGENCY MEDICINE

## 2020-01-26 PROCEDURE — 63600175 PHARM REV CODE 636 W HCPCS: Performed by: EMERGENCY MEDICINE

## 2020-01-26 PROCEDURE — 84100 ASSAY OF PHOSPHORUS: CPT

## 2020-01-26 PROCEDURE — 25000003 PHARM REV CODE 250: Mod: ER | Performed by: EMERGENCY MEDICINE

## 2020-01-26 PROCEDURE — 25000003 PHARM REV CODE 250: Performed by: NURSE PRACTITIONER

## 2020-01-26 PROCEDURE — 94640 AIRWAY INHALATION TREATMENT: CPT

## 2020-01-26 RX ORDER — BUSPIRONE HYDROCHLORIDE 10 MG/1
30 TABLET ORAL 2 TIMES DAILY
Status: DISCONTINUED | OUTPATIENT
Start: 2020-01-26 | End: 2020-01-26 | Stop reason: HOSPADM

## 2020-01-26 RX ORDER — METHYLPREDNISOLONE SOD SUCC 125 MG
80 VIAL (EA) INJECTION EVERY 8 HOURS
Status: DISCONTINUED | OUTPATIENT
Start: 2020-01-27 | End: 2020-01-26 | Stop reason: HOSPADM

## 2020-01-26 RX ORDER — CHLORDIAZEPOXIDE HYDROCHLORIDE 10 MG/1
10 CAPSULE, GELATIN COATED ORAL 3 TIMES DAILY
Status: DISCONTINUED | OUTPATIENT
Start: 2020-01-26 | End: 2020-01-26 | Stop reason: HOSPADM

## 2020-01-26 RX ORDER — BENZONATATE 100 MG/1
200 CAPSULE ORAL
Status: COMPLETED | OUTPATIENT
Start: 2020-01-26 | End: 2020-01-26

## 2020-01-26 RX ORDER — ALBUTEROL SULFATE 2.5 MG/.5ML
2.5 SOLUTION RESPIRATORY (INHALATION)
Status: COMPLETED | OUTPATIENT
Start: 2020-01-26 | End: 2020-01-26

## 2020-01-26 RX ORDER — QUETIAPINE FUMARATE 100 MG/1
200 TABLET, FILM COATED ORAL NIGHTLY
Status: DISCONTINUED | OUTPATIENT
Start: 2020-01-26 | End: 2020-01-26 | Stop reason: HOSPADM

## 2020-01-26 RX ORDER — ACETAMINOPHEN 325 MG/1
650 TABLET ORAL EVERY 4 HOURS PRN
Status: DISCONTINUED | OUTPATIENT
Start: 2020-01-26 | End: 2020-01-26 | Stop reason: HOSPADM

## 2020-01-26 RX ORDER — FAMOTIDINE 20 MG/1
20 TABLET, FILM COATED ORAL 2 TIMES DAILY
Status: DISCONTINUED | OUTPATIENT
Start: 2020-01-26 | End: 2020-01-26 | Stop reason: HOSPADM

## 2020-01-26 RX ORDER — TRAZODONE HYDROCHLORIDE 50 MG/1
100 TABLET ORAL NIGHTLY
Status: DISCONTINUED | OUTPATIENT
Start: 2020-01-26 | End: 2020-01-26 | Stop reason: HOSPADM

## 2020-01-26 RX ORDER — METHYLPREDNISOLONE SOD SUCC 125 MG
125 VIAL (EA) INJECTION ONCE
Status: COMPLETED | OUTPATIENT
Start: 2020-01-26 | End: 2020-01-26

## 2020-01-26 RX ORDER — SODIUM CHLORIDE 9 MG/ML
INJECTION, SOLUTION INTRAVENOUS CONTINUOUS
Status: DISCONTINUED | OUTPATIENT
Start: 2020-01-26 | End: 2020-01-26 | Stop reason: HOSPADM

## 2020-01-26 RX ORDER — SODIUM CHLORIDE 0.9 % (FLUSH) 0.9 %
10 SYRINGE (ML) INJECTION
Status: DISCONTINUED | OUTPATIENT
Start: 2020-01-26 | End: 2020-01-26 | Stop reason: HOSPADM

## 2020-01-26 RX ORDER — ONDANSETRON 2 MG/ML
4 INJECTION INTRAMUSCULAR; INTRAVENOUS EVERY 8 HOURS PRN
Status: DISCONTINUED | OUTPATIENT
Start: 2020-01-26 | End: 2020-01-26 | Stop reason: HOSPADM

## 2020-01-26 RX ORDER — BUPROPION HYDROCHLORIDE 150 MG/1
150 TABLET ORAL DAILY
Status: DISCONTINUED | OUTPATIENT
Start: 2020-01-26 | End: 2020-01-26 | Stop reason: HOSPADM

## 2020-01-26 RX ORDER — GUAIFENESIN 100 MG/5ML
100 SOLUTION ORAL
Status: COMPLETED | OUTPATIENT
Start: 2020-01-26 | End: 2020-01-26

## 2020-01-26 RX ORDER — PREDNISONE 20 MG/1
40 TABLET ORAL
Status: COMPLETED | OUTPATIENT
Start: 2020-01-26 | End: 2020-01-26

## 2020-01-26 RX ORDER — ALBUTEROL SULFATE 0.83 MG/ML
2.5 SOLUTION RESPIRATORY (INHALATION) EVERY 4 HOURS PRN
Status: DISCONTINUED | OUTPATIENT
Start: 2020-01-26 | End: 2020-01-26 | Stop reason: HOSPADM

## 2020-01-26 RX ADMIN — CHLORDIAZEPOXIDE HYDROCHLORIDE 10 MG: 10 CAPSULE ORAL at 04:01

## 2020-01-26 RX ADMIN — ALBUTEROL SULFATE 2.5 MG: 2.5 SOLUTION RESPIRATORY (INHALATION) at 09:01

## 2020-01-26 RX ADMIN — METHYLPREDNISOLONE SODIUM SUCCINATE 125 MG: 125 INJECTION, POWDER, FOR SOLUTION INTRAMUSCULAR; INTRAVENOUS at 04:01

## 2020-01-26 RX ADMIN — GUAIFENESIN 100 MG: 200 SOLUTION ORAL at 08:01

## 2020-01-26 RX ADMIN — PREDNISONE 40 MG: 20 TABLET ORAL at 10:01

## 2020-01-26 RX ADMIN — FOLIC ACID: 5 INJECTION, SOLUTION INTRAMUSCULAR; INTRAVENOUS; SUBCUTANEOUS at 04:01

## 2020-01-26 RX ADMIN — SODIUM CHLORIDE: 0.9 INJECTION, SOLUTION INTRAVENOUS at 04:01

## 2020-01-26 RX ADMIN — ALBUTEROL SULFATE 2.5 MG: 2.5 SOLUTION RESPIRATORY (INHALATION) at 01:01

## 2020-01-26 RX ADMIN — BUPROPION HYDROCHLORIDE 150 MG: 150 TABLET, FILM COATED, EXTENDED RELEASE ORAL at 08:01

## 2020-01-26 RX ADMIN — CHLORDIAZEPOXIDE HYDROCHLORIDE 10 MG: 10 CAPSULE ORAL at 08:01

## 2020-01-26 RX ADMIN — ALBUTEROL SULFATE 2.5 MG: 2.5 SOLUTION RESPIRATORY (INHALATION) at 08:01

## 2020-01-26 RX ADMIN — FAMOTIDINE 20 MG: 20 TABLET ORAL at 08:01

## 2020-01-26 RX ADMIN — BENZONATATE 200 MG: 100 CAPSULE ORAL at 11:01

## 2020-01-26 RX ADMIN — SODIUM CHLORIDE, SODIUM LACTATE, POTASSIUM CHLORIDE, AND CALCIUM CHLORIDE 1000 ML: .6; .31; .03; .02 INJECTION, SOLUTION INTRAVENOUS at 02:01

## 2020-01-26 RX ADMIN — BUSPIRONE HYDROCHLORIDE 30 MG: 10 TABLET ORAL at 08:01

## 2020-01-26 NOTE — HPI
Adal Bower is a 39 y.o. homeless male patient with a h/o asthma, depression and alcoholism who presents to the Emergency Department for evaluation of SOB which onset several days ago. Associated sxs include fever (Tnow 100.6), productive cough, wheezing, body aches, and delirium/chills secondary to alcohol withdrawl. Patient denies any diaphoresis, CP, leg swelling, n/v, and all other sxs at this time. Patient reports most recent alcoholic drink was this morning. He says that he just discharged from [facility] on 1/22/2020. Pt says he generally drinks beer and develops tremors when not drainage. He verbalized to nursing staff that he wished to slit his throat therefore, pt was PECd from the Emergency Room. A Telepsych consult was performed.  Pt's labs reflected an elevated lactic acid therefore, he was placed on Observation for medical stabilization. Vital signs on arrival: Temp 100.6, pulse 117, resp 18 - 24,  B/P 114/79 and SpO2 96 % on 2 L. CXR - no acute findings. Labs find microcytic anemia, potassium 3.4, lactate 3.1 >>> 5.2 and negative influenza.

## 2020-01-26 NOTE — SUBJECTIVE & OBJECTIVE
Interval History:  Resting comfortably.  In no apparent distress.  Complains of general mild body aches and fatigue.    Review of Systems   Constitutional: Positive for fatigue. Negative for chills and fever.   HENT: Negative.  Negative for congestion and sore throat.    Eyes: Negative.  Negative for visual disturbance.   Respiratory: Negative.  Negative for cough, shortness of breath and wheezing.    Cardiovascular: Negative.  Negative for chest pain.   Gastrointestinal: Negative for abdominal pain, diarrhea, nausea and vomiting.   Endocrine: Negative.    Genitourinary: Negative.    Musculoskeletal: Negative.  Negative for myalgias and neck stiffness.   Skin: Negative.  Negative for color change and pallor.   Allergic/Immunologic: Negative.    Neurological: Negative.    Hematological: Negative.    Psychiatric/Behavioral: Negative.    All other systems reviewed and are negative.    Objective:     Vital Signs (Most Recent):  Temp: 97.8 °F (36.6 °C) (01/26/20 0803)  Pulse: 75 (01/26/20 0836)  Resp: 20 (01/26/20 0836)  BP: (!) 125/91 (01/26/20 0801)  SpO2: 99 % (01/26/20 0836) Vital Signs (24h Range):  Temp:  [97.8 °F (36.6 °C)-100.6 °F (38.1 °C)] 97.8 °F (36.6 °C)  Pulse:  [] 75  Resp:  [16-24] 20  SpO2:  [94 %-100 %] 99 %  BP: (103-130)/(52-91) 125/91     Weight: 71.8 kg (158 lb 3.2 oz)  Body mass index is 21.46 kg/m².    Intake/Output Summary (Last 24 hours) at 1/26/2020 0934  Last data filed at 1/26/2020 0430  Gross per 24 hour   Intake 3154 ml   Output --   Net 3154 ml      Physical Exam   Constitutional: He is oriented to person, place, and time. He appears well-developed and well-nourished. No distress.   HENT:   Head: Normocephalic and atraumatic.   Mouth/Throat: Oropharynx is clear and moist.   Eyes: Pupils are equal, round, and reactive to light. Conjunctivae and EOM are normal.   Neck: No JVD present. No thyromegaly present.   Cardiovascular: Normal rate, regular rhythm and normal heart sounds. Exam  reveals no gallop and no friction rub.   No murmur heard.  Pulmonary/Chest: Effort normal and breath sounds normal. No respiratory distress. He has no wheezes. He has no rales.   Abdominal: Soft. Bowel sounds are normal. He exhibits no distension. There is no tenderness. There is no rebound and no guarding.   Musculoskeletal: Normal range of motion. He exhibits no edema or tenderness.   Lymphadenopathy:     He has no cervical adenopathy.   Neurological: He is alert and oriented to person, place, and time. He has normal reflexes. He displays normal reflexes. No cranial nerve deficit.   Skin: Skin is warm and dry. No rash noted. He is not diaphoretic. No erythema.   Psychiatric: He has a normal mood and affect. His behavior is normal. Judgment and thought content normal.       Significant Labs: All pertinent labs within the past 24 hours have been reviewed.    Significant Imaging: I have reviewed all pertinent imaging results/findings within the past 24 hours.

## 2020-01-26 NOTE — ASSESSMENT & PLAN NOTE
PECd by Emergency Physician.  Tele psych consult complete.  Psychotropic meds restarted.   consult to assist with placement.  24 hour sitter.  Lactic acidosis resolved and he remains hemodynamically stable.  Medically cleared for placement to inpatient Psychiatric facility.
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
## 2020-01-26 NOTE — ED PROVIDER NOTES
SCRIBE #1 NOTE: I, Manohar Ratliff, am scribing for, and in the presence of, Liam Cardoza MD. I have scribed the entire note.       History     Chief Complaint   Patient presents with    Shortness of Breath     with wheezing, congestion, and fever     Review of patient's allergies indicates:   Allergen Reactions    Peanut Anaphylaxis    Tree nuts Anaphylaxis         History of Present Illness     HPI    1/25/2020, 8:36 PM  History obtained from the patient      History of Present Illness: Adal Bower is a 39 y.o. male patient with a h/o asthma and alcoholism who presents to the Emergency Department for evaluation of SOB which onset several days ago. Symptoms are constant and moderate in severity. No mitigating or exacerbating factors reported. Associated sxs include fever (Tnow 100.6), productive cough, body aches, and delirium/chills secondary to alcohol withdrawl. Patient denies any diaphoresis, CP, leg swelling, n/v, and all other sxs at this time. No prior Tx reported. No further complaints or concerns at this time. Patient reports most recent alcoholic drink was this morning.      Arrival mode: EMS/AASI    PCP: Primary Doctor No      Past Medical History:  Past Medical History:   Diagnosis Date    Anxiety     Asthma     Depression        Past Surgical History:  History reviewed. No pertinent past surgical history.         Family History:  History reviewed. No pertinent family history.       Social History:   Social History     Tobacco Use    Smoking status: Current Every Day Smoker     Packs/day: 0.50   Substance and Sexual Activity    Alcohol use: Not Currently    Drug use: Yes     Types: Marijuana    Sexual activity: Unknown         Review of Systems     Review of Systems   Constitutional: Positive for chills and fever. Negative for diaphoresis.   HENT: Negative for sore throat.    Respiratory: Positive for cough and shortness of breath.    Cardiovascular: Negative for chest pain  and leg swelling.   Gastrointestinal: Negative for nausea and vomiting.   Genitourinary: Negative for dysuria.   Musculoskeletal: Positive for myalgias. Negative for back pain.   Skin: Negative for rash.   Neurological: Negative for weakness.   Hematological: Does not bruise/bleed easily.   Psychiatric/Behavioral: Positive for hallucinations.   All other systems reviewed and are negative.       Physical Exam     Initial Vitals [01/25/20 2020]   BP Pulse Resp Temp SpO2   114/79 (!) 117 18 (!) 100.6 °F (38.1 °C) 100 %      MAP       --          Physical Exam  Nursing Notes and Vital Signs Reviewed.  Constitutional: Well-developed and well-nourished. Mild distress. Ill appearing.  Head: Atraumatic. Normocephalic.  Eyes: PERRL. EOM intact. Conjunctivae are not pale. No scleral icterus.  ENT: Mucous membranes are moist. Oropharynx is clear and symmetric.    Neck: Supple. Full ROM. No lymphadenopathy.  Cardiovascular: Tachycardic. Regular rhythm. No murmurs, rubs, or gallops. Distal pulses are 2+ and symmetric.  Pulmonary/Chest: Tachypneic. Tight air exchange bilaterally. No wheezing or rales.  Abdominal: Soft and non-distended.  There is no tenderness.  No rebound, guarding, or rigidity. Good bowel sounds.  Genitourinary: No CVA tenderness  Musculoskeletal: Moves all extremities. No obvious deformities. No calf tenderness.  Skin: Diaphoretic. Warm to touch.  Neurological:  Alert, awake, and appropriate.  Normal speech.  No acute focal neurological deficits are appreciated. Bilateral large amplitude hand tremors.  Psychiatric: Anxious. Endorses visual hallucinations.      ED Course   Critical Care  Date/Time: 1/25/2020 10:00 PM  Performed by: Liam Cardoza MD  Authorized by: Liam Cardoza MD   Total critical care time (exclusive of procedural time) : 35 minutes  Critical care time was exclusive of separately billable procedures and treating other patients and teaching time.  Critical care was necessary to  treat or prevent imminent or life-threatening deterioration of the following conditions: dehydration.  Critical care was time spent personally by me on the following activities: development of treatment plan with patient or surrogate, discussions with consultants, interpretation of cardiac output measurements, evaluation of patient's response to treatment, examination of patient, obtaining history from patient or surrogate, ordering and performing treatments and interventions, ordering and review of laboratory studies, ordering and review of radiographic studies, pulse oximetry, re-evaluation of patient's condition and review of old charts.        ED Vital Signs:  Vitals:    01/26/20 0101 01/26/20 0201 01/26/20 0301 01/26/20 0501   BP: (!) 109/57 (!) 104/55 (!) 105/56 122/74   Pulse: 88 84 86 85   Resp: 20 20 20 20   Temp:       TempSrc:       SpO2: (!) 94% 97% 95% 99%   Weight:       Height:        01/26/20 0701 01/26/20 0801 01/26/20 0803 01/26/20 0836   BP: 130/78 (!) 125/91     Pulse: 77 98  75   Resp: (!) 21 20 20   Temp:   97.8 °F (36.6 °C)    TempSrc:   Oral    SpO2: 97% 97%  99%   Weight:       Height:        01/26/20 0901 01/26/20 0931 01/26/20 1002 01/26/20 1031   BP: 118/66 (!) 106/58 135/62 (!) 150/68   Pulse: 80 83 72 68   Resp:   18 19   Temp:       TempSrc:       SpO2: 98% 97% 97% 99%   Weight:       Height:        01/26/20 1048 01/26/20 1330 01/26/20 1400   BP: (!) 140/73  138/73   Pulse: 72 83 97   Resp: 18 18 18   Temp: 98.6 °F (37 °C)  97.6 °F (36.4 °C)   TempSrc:   Oral   SpO2: 98% 98% 97%   Weight:      Height:          Abnormal Lab Results:  Labs Reviewed   CBC W/ AUTO DIFFERENTIAL - Abnormal; Notable for the following components:       Result Value    Hemoglobin 10.3 (*)     Hematocrit 34.4 (*)     Mean Corpuscular Volume 75 (*)     Mean Corpuscular Hemoglobin 22.3 (*)     Mean Corpuscular Hemoglobin Conc 29.9 (*)     RDW 22.7 (*)     MPV 8.9 (*)     Lymph # 0.4 (*)     Gran% 85.4 (*)      Lymph% 5.8 (*)     All other components within normal limits   COMPREHENSIVE METABOLIC PANEL - Abnormal; Notable for the following components:    Potassium 3.4 (*)     AST 41 (*)     All other components within normal limits   LACTIC ACID, PLASMA - Abnormal; Notable for the following components:    Lactate (Lactic Acid) 3.1 (*)     All other components within normal limits   URINALYSIS, REFLEX TO URINE CULTURE - Abnormal; Notable for the following components:    Specific Gravity, UA >=1.030 (*)     Protein, UA Trace (*)     All other components within normal limits    Narrative:     Preferred Collection Type->Urine, Clean Catch   LACTIC ACID, PLASMA - Abnormal; Notable for the following components:    Lactate (Lactic Acid) 5.2 (*)     All other components within normal limits    Narrative:      LA  critical result(s) called and verbal readback obtained from Ines Merlos RN by [facility] 01/26/2020 02:01   COMPREHENSIVE METABOLIC PANEL - Abnormal; Notable for the following components:    Glucose 157 (*)     Albumin 3.4 (*)     Alkaline Phosphatase 52 (*)     All other components within normal limits   PHOSPHORUS - Abnormal; Notable for the following components:    Phosphorus 2.3 (*)     All other components within normal limits   CBC W/ AUTO DIFFERENTIAL - Abnormal; Notable for the following components:    RBC 4.25 (*)     Hemoglobin 9.6 (*)     Hematocrit 32.5 (*)     Mean Corpuscular Volume 77 (*)     Mean Corpuscular Hemoglobin 22.6 (*)     Mean Corpuscular Hemoglobin Conc 29.5 (*)     RDW 22.8 (*)     MPV 9.0 (*)     Lymph # 0.3 (*)     Gran% 91.1 (*)     Lymph% 4.5 (*)     All other components within normal limits   CULTURE, BLOOD    Narrative:     Aerobic and anaerobic   CULTURE, BLOOD    Narrative:     Aerobic and anaerobic   INFLUENZA A & B BY MOLECULAR   HIV 1 / 2 ANTIBODY   TROPONIN I   PROCALCITONIN   HETEROPHILE AB SCREEN   MAGNESIUM   LACTIC ACID, PLASMA   ALCOHOL,MEDICAL (ETHANOL)   DRUG SCREEN PANEL, URINE  EMERGENCY   HETEROPHILE ANTIBODY TITER        All Lab Results:  Results for orders placed or performed during the hospital encounter of 01/25/20   Blood culture x two cultures. Draw prior to antibiotics.   Result Value Ref Range    Blood Culture, Routine No Growth to date    Blood culture x two cultures. Draw prior to antibiotics.   Result Value Ref Range    Blood Culture, Routine No Growth to date    Influenza A & B by Molecular   Result Value Ref Range    Influenza A, Molecular Negative Negative    Influenza B, Molecular Negative Negative    Flu A & B Source Nasal swab    HIV 1/2 Ag/Ab (4th Gen)   Result Value Ref Range    HIV 1/2 Ag/Ab Negative Negative   CBC auto differential   Result Value Ref Range    WBC 6.08 3.90 - 12.70 K/uL    RBC 4.61 4.60 - 6.20 M/uL    Hemoglobin 10.3 (L) 14.0 - 18.0 g/dL    Hematocrit 34.4 (L) 40.0 - 54.0 %    Mean Corpuscular Volume 75 (L) 82 - 98 fL    Mean Corpuscular Hemoglobin 22.3 (L) 27.0 - 31.0 pg    Mean Corpuscular Hemoglobin Conc 29.9 (L) 32.0 - 36.0 g/dL    RDW 22.7 (H) 11.5 - 14.5 %    Platelets 248 150 - 350 K/uL    MPV 8.9 (L) 9.2 - 12.9 fL    Immature Granulocytes 0.3 0.0 - 0.5 %    Gran # (ANC) 5.2 1.8 - 7.7 K/uL    Immature Grans (Abs) 0.02 0.00 - 0.04 K/uL    Lymph # 0.4 (L) 1.0 - 4.8 K/uL    Mono # 0.5 0.3 - 1.0 K/uL    Eos # 0.0 0.0 - 0.5 K/uL    Baso # 0.01 0.00 - 0.20 K/uL    nRBC 0 0 /100 WBC    Gran% 85.4 (H) 38.0 - 73.0 %    Lymph% 5.8 (L) 18.0 - 48.0 %    Mono% 8.1 4.0 - 15.0 %    Eosinophil% 0.2 0.0 - 8.0 %    Basophil% 0.2 0.0 - 1.9 %    Differential Method Automated    Comprehensive metabolic panel   Result Value Ref Range    Sodium 137 136 - 145 mmol/L    Potassium 3.4 (L) 3.5 - 5.1 mmol/L    Chloride 100 95 - 110 mmol/L    CO2 23 23 - 29 mmol/L    Glucose 108 70 - 110 mg/dL    BUN, Bld 15 6 - 20 mg/dL    Creatinine 1.0 0.5 - 1.4 mg/dL    Calcium 9.3 8.7 - 10.5 mg/dL    Total Protein 7.1 6.0 - 8.4 g/dL    Albumin 4.1 3.5 - 5.2 g/dL    Total Bilirubin 0.6  0.1 - 1.0 mg/dL    Alkaline Phosphatase 59 55 - 135 U/L    AST 41 (H) 10 - 40 U/L    ALT 37 10 - 44 U/L    Anion Gap 14 8 - 16 mmol/L    eGFR if African American >60 >60 mL/min/1.73 m^2    eGFR if non African American >60 >60 mL/min/1.73 m^2   Lactic acid, plasma #1   Result Value Ref Range    Lactate (Lactic Acid) 3.1 (H) 0.5 - 2.2 mmol/L   Urinalysis, Reflex to Urine Culture Urine, Clean Catch   Result Value Ref Range    Specimen UA Urine, Clean Catch     Color, UA Yellow Yellow, Straw, Paulina    Appearance, UA Clear Clear    pH, UA 6.0 5.0 - 8.0    Specific Gravity, UA >=1.030 (A) 1.005 - 1.030    Protein, UA Trace (A) Negative    Glucose, UA Negative Negative    Ketones, UA Negative Negative    Bilirubin (UA) Negative Negative    Occult Blood UA Negative Negative    Nitrite, UA Negative Negative    Urobilinogen, UA Negative <2.0 EU/dL    Leukocytes, UA Negative Negative   Troponin I   Result Value Ref Range    Troponin I 0.008 0.000 - 0.026 ng/mL   Procalcitonin   Result Value Ref Range    Procalcitonin 0.06 <0.25 ng/mL   Lactic acid, plasma #2   Result Value Ref Range    Lactate (Lactic Acid) 5.2 (HH) 0.5 - 2.2 mmol/L   Comprehensive metabolic panel   Result Value Ref Range    Sodium 140 136 - 145 mmol/L    Potassium 4.0 3.5 - 5.1 mmol/L    Chloride 106 95 - 110 mmol/L    CO2 24 23 - 29 mmol/L    Glucose 157 (H) 70 - 110 mg/dL    BUN, Bld 13 6 - 20 mg/dL    Creatinine 0.9 0.5 - 1.4 mg/dL    Calcium 9.0 8.7 - 10.5 mg/dL    Total Protein 6.3 6.0 - 8.4 g/dL    Albumin 3.4 (L) 3.5 - 5.2 g/dL    Total Bilirubin 0.3 0.1 - 1.0 mg/dL    Alkaline Phosphatase 52 (L) 55 - 135 U/L    AST 30 10 - 40 U/L    ALT 30 10 - 44 U/L    Anion Gap 10 8 - 16 mmol/L    eGFR if African American >60 >60 mL/min/1.73 m^2    eGFR if non African American >60 >60 mL/min/1.73 m^2   Phosphorus   Result Value Ref Range    Phosphorus 2.3 (L) 2.7 - 4.5 mg/dL   Magnesium   Result Value Ref Range    Magnesium 1.9 1.6 - 2.6 mg/dL   CBC auto  differential   Result Value Ref Range    WBC 7.52 3.90 - 12.70 K/uL    RBC 4.25 (L) 4.60 - 6.20 M/uL    Hemoglobin 9.6 (L) 14.0 - 18.0 g/dL    Hematocrit 32.5 (L) 40.0 - 54.0 %    Mean Corpuscular Volume 77 (L) 82 - 98 fL    Mean Corpuscular Hemoglobin 22.6 (L) 27.0 - 31.0 pg    Mean Corpuscular Hemoglobin Conc 29.5 (L) 32.0 - 36.0 g/dL    RDW 22.8 (H) 11.5 - 14.5 %    Platelets 235 150 - 350 K/uL    MPV 9.0 (L) 9.2 - 12.9 fL    Immature Granulocytes 0.4 0.0 - 0.5 %    Gran # (ANC) 6.9 1.8 - 7.7 K/uL    Immature Grans (Abs) 0.03 0.00 - 0.04 K/uL    Lymph # 0.3 (L) 1.0 - 4.8 K/uL    Mono # 0.3 0.3 - 1.0 K/uL    Eos # 0.0 0.0 - 0.5 K/uL    Baso # 0.00 0.00 - 0.20 K/uL    nRBC 0 0 /100 WBC    Gran% 91.1 (H) 38.0 - 73.0 %    Lymph% 4.5 (L) 18.0 - 48.0 %    Mono% 4.0 4.0 - 15.0 %    Eosinophil% 0.0 0.0 - 8.0 %    Basophil% 0.0 0.0 - 1.9 %    Platelet Estimate Appears normal     Aniso Slight     Poik Slight     Poly Occasional     Hypo Occasional     Ovalocytes Occasional     Tear Drop Cells Occasional     Differential Method Automated    Lactic acid, plasma   Result Value Ref Range    Lactate (Lactic Acid) 2.1 0.5 - 2.2 mmol/L   Drug screen panel, emergency   Result Value Ref Range    Benzodiazepines Negative     Methadone metabolites Negative     Cocaine (Metab.) Negative     Opiate Scrn, Ur Negative     Barbiturate Screen, Ur Negative     Amphetamine Screen, Ur Negative     THC Negative     Phencyclidine Negative     Creatinine, Random Ur 34.5 23.0 - 375.0 mg/dL    Toxicology Information SEE COMMENT          Imaging Results          X-Ray Chest 1 View (Final result)  Result time 01/25/20 21:24:36   Procedure changed from X-Ray Chest PA And Lateral     Final result by Luis Kelly MD (01/25/20 21:24:36)                 Impression:      No acute cardiopulmonary disease.      Electronically signed by: Luis Kelly MD  Date:    01/25/2020  Time:    21:24             Narrative:    EXAMINATION:  XR CHEST 1 VIEW    CLINICAL  HISTORY:  fever ; Fever, unspecified    TECHNIQUE:  Single frontal view of the chest was performed.    COMPARISON:  None    FINDINGS:  Lungs are clear.  The heart and mediastinal structures appear normal.                                 The EKG was ordered, reviewed, and independently interpreted by the ED provider.  Interpretation time: 2103  Rate: 113 BPM  Rhythm: sinus tachycardia  Interpretation: no acute ST changes. No STEMI.      The Emergency Provider reviewed the vital signs and test results, which are outlined above.     ED Discussion     10:24 PM: patient endorses active SI.    11:48 PM: Discussed pt's case with Dr. Perez (Psychiatry) who recommends PEC and inpatient psychiatric evaluation.    1:00 AM: The PEC hold has been issued by Dr. Cardoza at this time for SI.    2:37 AM: Discussed case with Dr. Carcamo ([facility]). Dr. Carcamo agrees with current care and management of pt and accepts admission.   Admitting Service: [facility]  Admit to: obs / med surg    Re-evaluated pt. I have discussed test results, shared treatment plan, and the need for admission with patient and family at bedside. Pt and family express understanding at this time and agree with all information. All questions answered. Pt and family have no further questions or concerns at this time. Pt is ready for admit.       MDM        Medical Decision Making:   Clinical Tests:   Lab Tests: Ordered and Reviewed  Radiological Study: Ordered and Reviewed  Medical Tests: Ordered and Reviewed           ED Medication(s):  Medications   lactated ringers bolus 2,154 mL (0 mL/kg × 71.8 kg Intravenous Stopped 1/25/20 2352)   lorazepam (ATIVAN) injection 1 mg (1 mg Intravenous Given 1/25/20 2155)   ketorolac injection 15 mg (15 mg Intravenous Given 1/25/20 2155)   acetaminophen tablet 1,000 mg (1,000 mg Oral Given 1/25/20 2155)   albuterol-ipratropium 2.5 mg-0.5 mg/3 mL nebulizer solution 3 mL (3 mLs Nebulization Given 1/25/20 2111)    lactated ringers bolus 1,000 mL (0 mLs Intravenous Stopped 1/26/20 0430)   methylPREDNISolone sodium succinate injection 125 mg (125 mg Intravenous Given 1/26/20 0435)       Discharge Medication List as of 1/26/2020  2:41 PM                  Scribe Attestation:   Scribe #1: I performed the above scribed service and the documentation accurately describes the services I performed. I attest to the accuracy of the note.     Attending:   Physician Attestation Statement for Scribe #1: I, Liam Cardoza MD, personally performed the services described in this documentation, as scribed by Manohar Ratliff, in my presence, and it is both accurate and complete.           Clinical Impression       ICD-10-CM ICD-9-CM   1. Fever R50.9 780.60   2. Tachycardia R00.0 785.0   3. Alcohol withdrawal syndrome with complication F10.239 291.81   4. Lactic acidosis E87.2 276.2   5. Suicidal ideation R45.851 V62.84       Disposition:   Disposition: Placed in Observation  Condition: Fair         Liam Cardoza MD  01/27/20 5732

## 2020-01-26 NOTE — ED NOTES
Patient belongs     1 pair of brown shoes   Blue Jeans   Black Glasses   2 Necklace   1 black wallet ( 10 cards and yellow paper with numbers )  Black hat   Black shirt  Black jacket   Black socks   Black shoes   Black bag   Blue jacket   Bic Lighter  2 white papers with numbers   2 random small white papers  1 phone       Inhaler   2 cell phones

## 2020-01-26 NOTE — ASSESSMENT & PLAN NOTE
Frequent admissions to psychiatric hospitals  Resume psychotropic meds  + suicidal ideation  24 hour sitter   consult to assist with placement

## 2020-01-26 NOTE — ASSESSMENT & PLAN NOTE
Secondary to ETOH intake and dehydration  Also, could be contributed by albuterol breathing treatment  Infectious source not suspected

## 2020-01-26 NOTE — ASSESSMENT & PLAN NOTE
Monitor for S/S of withdrawal  Scheduled librium  Ativan prn for increased S/S of withdrawal  IV hydration  Banana bag daily

## 2020-01-26 NOTE — H&P
[facility] Hospital Medicine  History & Physical    Patient Name: Adal Bower  MRN: [id number]  Admission Date: 1/25/2020  Attending Physician: Mat Carcamo MD  Primary Care Provider: Primary Doctor No         Patient information was obtained from patient, past medical records and ER records.     Subjective:     Principal Problem:Lactic acidosis    Chief Complaint:   Chief Complaint   Patient presents with    Shortness of Breath     with wheezing, congestion, and fever        HPI: Adal Bower is a 39 y.o.  homeless male patient with a h/o asthma, depression and alcoholism who presents to the Emergency Department for evaluation of SOB which onset several days ago. Associated sxs include fever (Tnow 100.6), productive cough, wheezing, body aches, and delirium/chills secondary to alcohol withdrawl. Patient denies any diaphoresis, CP, leg swelling, n/v, and all other sxs at this time. Patient reports most recent alcoholic drink was this morning. He says that he just discharged from [facility] on 1/22/2020. Pt says he generally drinks beer and develops tremors when not drainage. He verbalized to nursing staff that he wished to slit his throat therefore, pt was PECd from the Emergency Room. A Telepsych consult was performed.  Pt's labs reflected an elevated lactic acid therefore, he was placed on Observation for medical stabilization. Vital signs on arrival: Temp 100.6, pulse 117, resp 18 - 24,  B/P 114/79 and SpO2 96 % on 2 L. CXR - no acute findings. Labs find microcytic anemia, potassium 3.4, lactate 3.1 >>> 5.2 and negative influenza.        Past Medical History:   Diagnosis Date    Anxiety     Asthma     Depression        No past surgical history on file.    Review of patient's allergies indicates:   Allergen Reactions    Peanut Anaphylaxis    Tree nuts Anaphylaxis       No current facility-administered medications on file prior to encounter.       Current Outpatient Medications on File Prior to Encounter   Medication Sig    buPROPion (WELLBUTRIN XL) 150 MG TB24 tablet Take 150 mg by mouth once daily.    busPIRone (BUSPAR) 30 MG Tab Take 30 mg by mouth 3 (three) times daily.    QUEtiapine (SEROQUEL) 200 MG Tab Take 200 mg by mouth nightly.    traZODone (DESYREL) 100 MG tablet Take 100 mg by mouth every evening.     Family History     Reviewed and Not Pertinent        Tobacco Use    Smoking status: Current Every Day Smoker     Packs/day: 0.50   Substance and Sexual Activity    Alcohol use: Not Currently    Drug use: Yes     Types: Marijuana    Sexual activity: Not on file     Review of Systems   Constitutional: Positive for activity change, chills and fever.   HENT: Negative.    Respiratory: Positive for cough, shortness of breath and wheezing.    Cardiovascular: Negative for chest pain and palpitations.   Gastrointestinal: Positive for diarrhea. Negative for abdominal pain, nausea and vomiting.   Genitourinary: Negative.    Musculoskeletal: Positive for myalgias.   Skin: Negative for pallor, rash and wound.   Neurological: Positive for weakness and light-headedness.   Psychiatric/Behavioral: Negative for confusion. The patient is nervous/anxious.      Objective:     Vital Signs (Most Recent):  Temp: 99.4 °F (37.4 °C) (01/26/20 0000)  Pulse: 86 (01/26/20 0301)  Resp: 20 (01/26/20 0301)  BP: (!) 105/56 (01/26/20 0301)  SpO2: 95 % (01/26/20 0301) Vital Signs (24h Range):  Temp:  [99.4 °F (37.4 °C)-100.6 °F (38.1 °C)] 99.4 °F (37.4 °C)  Pulse:  [] 86  Resp:  [16-24] 20  SpO2:  [94 %-100 %] 95 %  BP: (103-114)/(52-79) 105/56     Weight: 71.8 kg (158 lb 3.2 oz)  Body mass index is 21.46 kg/m².    Physical Exam   Constitutional: He is oriented to person, place, and time. He appears well-developed.   HENT:   Head: Normocephalic and atraumatic.   Nose: Nose normal.   Mouth/Throat: Oropharynx is clear and moist.   Eyes: Conjunctivae are normal. No  scleral icterus.   Neck: Normal range of motion. Neck supple.   Cardiovascular: Normal rate, regular rhythm and normal heart sounds. Exam reveals no gallop and no friction rub.   No murmur heard.  Pulmonary/Chest: Effort normal. He has decreased breath sounds. He has wheezes.   Scant expiratory wheezing - mostly diminished breath sounds   Abdominal: Soft. Bowel sounds are normal.   Musculoskeletal: Normal range of motion. He exhibits no edema or tenderness.   Neurological: He is alert and oriented to person, place, and time.   Skin: Skin is warm and dry.   Psychiatric: His behavior is normal. He exhibits a depressed mood. He expresses suicidal ideation.   Vitals reviewed.          Significant Labs:   CBC:   Recent Labs   Lab 01/25/20 2110   WBC 6.08   HGB 10.3*   HCT 34.4*        CMP:   Recent Labs   Lab 01/25/20 2110      K 3.4*      CO2 23      BUN 15   CREATININE 1.0   CALCIUM 9.3   PROT 7.1   ALBUMIN 4.1   BILITOT 0.6   ALKPHOS 59   AST 41*   ALT 37   ANIONGAP 14   EGFRNONAA >60     All pertinent labs within the past 24 hours have been reviewed.    Significant Imaging: I have reviewed all pertinent imaging results/findings within the past 24 hours.    Assessment/Plan:     * Lactic acidosis  Secondary to ETOH intake and dehydration  Also, could be contributed by albuterol breathing treatment  Infectious source not suspected      Alcohol abuse  Monitor for S/S of withdrawal  Scheduled librium  Ativan prn for increased S/S of withdrawal  IV hydration  Banana bag daily      Suicidal ideation  PECd by Emergency Physician  Tele psych consult complete  Psychotropic meds restarted   consult to assist with placement  24 hour sitter    Depression  Frequent admissions to psychiatric hospitals  Resume psychotropic meds  + suicidal ideation  24 hour sitter   consult to assist with placement      Fever  Normal WBC  Likely viral in nature  Trend further fevers  Hold ABX  for now as no obvious source of infection        VTE Risk Mitigation (From admission, onward)         Ordered     IP VTE LOW RISK PATIENT  Once      01/26/20 0320                   Mat Carcamo MD  Department of Hospital Medicine   [facility]

## 2020-01-26 NOTE — SUBJECTIVE & OBJECTIVE
Past Medical History:   Diagnosis Date    Anxiety     Asthma     Depression        No past surgical history on file.    Review of patient's allergies indicates:   Allergen Reactions    Peanut Anaphylaxis    Tree nuts Anaphylaxis       No current facility-administered medications on file prior to encounter.      Current Outpatient Medications on File Prior to Encounter   Medication Sig    buPROPion (WELLBUTRIN XL) 150 MG TB24 tablet Take 150 mg by mouth once daily.    busPIRone (BUSPAR) 30 MG Tab Take 30 mg by mouth 3 (three) times daily.    QUEtiapine (SEROQUEL) 200 MG Tab Take 200 mg by mouth nightly.    traZODone (DESYREL) 100 MG tablet Take 100 mg by mouth every evening.     Family History     None        Tobacco Use    Smoking status: Current Every Day Smoker     Packs/day: 0.50   Substance and Sexual Activity    Alcohol use: Not Currently    Drug use: Yes     Types: Marijuana    Sexual activity: Not on file     Review of Systems   Constitutional: Positive for activity change, chills and fever.   HENT: Negative.    Respiratory: Positive for cough, shortness of breath and wheezing.    Cardiovascular: Negative for chest pain and palpitations.   Gastrointestinal: Positive for diarrhea. Negative for abdominal pain, nausea and vomiting.   Genitourinary: Negative.    Musculoskeletal: Positive for myalgias.   Skin: Negative for pallor, rash and wound.   Neurological: Positive for weakness and light-headedness.   Psychiatric/Behavioral: Negative for confusion. The patient is nervous/anxious.      Objective:     Vital Signs (Most Recent):  Temp: 99.4 °F (37.4 °C) (01/26/20 0000)  Pulse: 86 (01/26/20 0301)  Resp: 20 (01/26/20 0301)  BP: (!) 105/56 (01/26/20 0301)  SpO2: 95 % (01/26/20 0301) Vital Signs (24h Range):  Temp:  [99.4 °F (37.4 °C)-100.6 °F (38.1 °C)] 99.4 °F (37.4 °C)  Pulse:  [] 86  Resp:  [16-24] 20  SpO2:  [94 %-100 %] 95 %  BP: (103-114)/(52-79) 105/56     Weight: 71.8 kg (158 lb 3.2  oz)  Body mass index is 21.46 kg/m².    Physical Exam   Constitutional: He is oriented to person, place, and time. He appears well-developed.   HENT:   Head: Normocephalic and atraumatic.   Nose: Nose normal.   Mouth/Throat: Oropharynx is clear and moist.   Eyes: Conjunctivae are normal. No scleral icterus.   Neck: Normal range of motion. Neck supple.   Cardiovascular: Normal rate, regular rhythm and normal heart sounds. Exam reveals no gallop and no friction rub.   No murmur heard.  Pulmonary/Chest: Effort normal. He has decreased breath sounds. He has wheezes.   Scant expiratory wheezing - mostly diminished breath sounds   Abdominal: Soft. Bowel sounds are normal.   Musculoskeletal: Normal range of motion. He exhibits no edema or tenderness.   Neurological: He is alert and oriented to person, place, and time.   Skin: Skin is warm and dry.   Psychiatric: His behavior is normal. He exhibits a depressed mood. He expresses suicidal ideation.   Vitals reviewed.          Significant Labs:   CBC:   Recent Labs   Lab 01/25/20  2110   WBC 6.08   HGB 10.3*   HCT 34.4*        CMP:   Recent Labs   Lab 01/25/20  2110      K 3.4*      CO2 23      BUN 15   CREATININE 1.0   CALCIUM 9.3   PROT 7.1   ALBUMIN 4.1   BILITOT 0.6   ALKPHOS 59   AST 41*   ALT 37   ANIONGAP 14   EGFRNONAA >60     All pertinent labs within the past 24 hours have been reviewed.    Significant Imaging: I have reviewed all pertinent imaging results/findings within the past 24 hours.

## 2020-01-26 NOTE — ASSESSMENT & PLAN NOTE
Normal WBC  Likely viral in nature  Trend further fevers  Hold ABX for now as no obvious source of infection

## 2020-01-26 NOTE — ASSESSMENT & PLAN NOTE
PECd by Emergency Physician  Tele psych consult complete  Psychotropic meds restarted   consult to assist with placement  24 hour sitter

## 2020-01-26 NOTE — PROGRESS NOTES
[facility] Hospital Medicine  Progress Note    Patient Name: Adal Bower  MRN: [id number]  Patient Class: OP- Observation   Admission Date: 1/25/2020  Length of Stay: 0 days  Attending Physician: Liam Cardoza MD  Primary Care Provider: Primary Doctor No        Subjective:     Principal Problem:Suicidal ideation    HPI:  Adal Bowre is a 39 y.o.  homeless male patient with a h/o asthma, depression and alcoholism who presents to the Emergency Department for evaluation of SOB which onset several days ago. Associated sxs include fever (Tnow 100.6), productive cough, wheezing, body aches, and delirium/chills secondary to alcohol withdrawl. Patient denies any diaphoresis, CP, leg swelling, n/v, and all other sxs at this time. Patient reports most recent alcoholic drink was this morning. He says that he just discharged from [facility] on 1/22/2020. Pt says he generally drinks beer and develops tremors when not drainage. He verbalized to nursing staff that he wished to slit his throat therefore, pt was PECd from the Emergency Room. A Telepsych consult was performed.  Pt's labs reflected an elevated lactic acid therefore, he was placed on Observation for medical stabilization. Vital signs on arrival: Temp 100.6, pulse 117, resp 18 - 24,  B/P 114/79 and SpO2 96 % on 2 L. CXR - no acute findings. Labs find microcytic anemia, potassium 3.4, lactate 3.1 >>> 5.2 and negative influenza.        Overview/Hospital Course:  No notes on file    Interval History:  Resting comfortably.  In no apparent distress.  Complains of general mild body aches and fatigue.    Review of Systems   Constitutional: Positive for fatigue. Negative for chills and fever.   HENT: Negative.  Negative for congestion and sore throat.    Eyes: Negative.  Negative for visual disturbance.   Respiratory: Negative.  Negative for cough, shortness of breath and wheezing.    Cardiovascular: Negative.   Negative for chest pain.   Gastrointestinal: Negative for abdominal pain, diarrhea, nausea and vomiting.   Endocrine: Negative.    Genitourinary: Negative.    Musculoskeletal: Negative.  Negative for myalgias and neck stiffness.   Skin: Negative.  Negative for color change and pallor.   Allergic/Immunologic: Negative.    Neurological: Negative.    Hematological: Negative.    Psychiatric/Behavioral: Negative.    All other systems reviewed and are negative.    Objective:     Vital Signs (Most Recent):  Temp: 97.8 °F (36.6 °C) (01/26/20 0803)  Pulse: 75 (01/26/20 0836)  Resp: 20 (01/26/20 0836)  BP: (!) 125/91 (01/26/20 0801)  SpO2: 99 % (01/26/20 0836) Vital Signs (24h Range):  Temp:  [97.8 °F (36.6 °C)-100.6 °F (38.1 °C)] 97.8 °F (36.6 °C)  Pulse:  [] 75  Resp:  [16-24] 20  SpO2:  [94 %-100 %] 99 %  BP: (103-130)/(52-91) 125/91     Weight: 71.8 kg (158 lb 3.2 oz)  Body mass index is 21.46 kg/m².    Intake/Output Summary (Last 24 hours) at 1/26/2020 0934  Last data filed at 1/26/2020 0430  Gross per 24 hour   Intake 3154 ml   Output --   Net 3154 ml      Physical Exam   Constitutional: He is oriented to person, place, and time. He appears well-developed and well-nourished. No distress.   HENT:   Head: Normocephalic and atraumatic.   Mouth/Throat: Oropharynx is clear and moist.   Eyes: Pupils are equal, round, and reactive to light. Conjunctivae and EOM are normal.   Neck: No JVD present. No thyromegaly present.   Cardiovascular: Normal rate, regular rhythm and normal heart sounds. Exam reveals no gallop and no friction rub.   No murmur heard.  Pulmonary/Chest: Effort normal and breath sounds normal. No respiratory distress. He has no wheezes. He has no rales.   Abdominal: Soft. Bowel sounds are normal. He exhibits no distension. There is no tenderness. There is no rebound and no guarding.   Musculoskeletal: Normal range of motion. He exhibits no edema or tenderness.   Lymphadenopathy:     He has no cervical  adenopathy.   Neurological: He is alert and oriented to person, place, and time. He has normal reflexes. He displays normal reflexes. No cranial nerve deficit.   Skin: Skin is warm and dry. No rash noted. He is not diaphoretic. No erythema.   Psychiatric: He has a normal mood and affect. His behavior is normal. Judgment and thought content normal.       Significant Labs: All pertinent labs within the past 24 hours have been reviewed.    Significant Imaging: I have reviewed all pertinent imaging results/findings within the past 24 hours.      Assessment/Plan:      * Suicidal ideation  PECd by Emergency Physician.  Tele psych consult complete.  Psychotropic meds restarted.   consult to assist with placement.  24 hour sitter.  Lactic acidosis resolved and he remains hemodynamically stable.  Medically cleared for placement to inpatient Psychiatric facility.    Lactic acidosis  Secondary to ETOH intake and dehydration.  On admission 3.1 then increased to 5.2 now down to 2.1.  Impaired Lactate clearance due to Ethanol and he receive 3.2 L of lactated ringers in fluid resuscitation.  Infectious source not suspected.    Alcohol abuse  Monitor for S/S of withdrawal  Scheduled librium  Ativan prn for increased S/S of withdrawal  IV hydration  Banana bag daily      Depression  Frequent admissions to psychiatric hospitals  Resume psychotropic meds  + suicidal ideation  24 hour sitter   consult to assist with placement      Fever  Normal WBC  Likely viral in nature  Trend further fevers  Hold ABX for now as no obvious source of infection        VTE Risk Mitigation (From admission, onward)         Ordered     IP VTE LOW RISK PATIENT  Once      01/26/20 0320                      Clive Douglas MD  Department of Hospital Medicine   [facility]

## 2020-01-26 NOTE — ASSESSMENT & PLAN NOTE
Secondary to ETOH intake and dehydration.  On admission 3.1 then increased to 5.2 now down to 2.1.  Impaired Lactate clearance due to Ethanol and he receive 3.2 L of lactated ringers in fluid resuscitation.  Infectious source not suspected.

## 2020-01-27 PROBLEM — J20.9 ACUTE BRONCHITIS: Status: ACTIVE | Noted: 2020-01-27

## 2020-01-27 PROBLEM — Z13.9 ENCOUNTER FOR MEDICAL SCREENING EXAMINATION: Status: ACTIVE | Noted: 2020-01-27

## 2020-01-27 PROBLEM — D50.9 MICROCYTIC ANEMIA: Chronic | Status: ACTIVE | Noted: 2020-01-27

## 2020-01-27 NOTE — ED NOTES
"" I had ciara flu 10 years ago, and the only think that helped me was mucinex. I need some mucinex." currently 96 on room air.  "

## 2020-01-27 NOTE — ED PROVIDER NOTES
Encounter Date: 1/26/2020       History     Chief Complaint   Patient presents with    Shortness of Breath     38 y/o M inpatient at [facility] sent to Er for evaluation of SOB. Pt reports SOB for a few days, Pt reports hx of asthma,.      The patient is a 39-year-old male who has a history of anxiety, asthma, depression.  He is currently admitted at [facility].  He presents with two days of cough, chest congestion, and shortness of breath. He has associated wheezing.  He has subjective fever (caretaker from facility reports that he has been afebrile when his temperature has been checked).  He denies chest pain. He denies abdominal pain, nausea, and vomiting.    The history is provided by the patient and a caregiver. No  was used.     Review of patient's allergies indicates:   Allergen Reactions    Peanut Anaphylaxis    Tree nuts Anaphylaxis     Past Medical History:   Diagnosis Date    Anxiety     Asthma     Depression      History reviewed. No pertinent surgical history.  History reviewed. No pertinent family history.  Social History     Tobacco Use    Smoking status: Current Every Day Smoker     Packs/day: 0.50   Substance Use Topics    Alcohol use: Not Currently    Drug use: Yes     Types: Marijuana     Review of Systems   Constitutional: Negative for chills and fever.   HENT: Negative for sore throat.    Respiratory: Positive for cough, shortness of breath and wheezing.    Cardiovascular: Negative for chest pain.   Gastrointestinal: Negative for abdominal pain, nausea and vomiting.   Neurological: Negative for dizziness, light-headedness and headaches.       Physical Exam     Initial Vitals [01/26/20 2016]   BP Pulse Resp Temp SpO2   138/75 75 (!) 26 98.2 °F (36.8 °C) 96 %      MAP       --         Physical Exam    Nursing note and vitals reviewed.  Constitutional: He appears well-developed and well-nourished. He is not diaphoretic. No distress.   HENT:   Head: Normocephalic.    Mouth/Throat: Oropharynx is clear and moist.   Eyes: Conjunctivae are normal. No scleral icterus.   Neck: No JVD present.   Cardiovascular: Normal rate, regular rhythm and normal heart sounds. Exam reveals no gallop and no friction rub.    No murmur heard.  Pulmonary/Chest: No accessory muscle usage or stridor. No tachypnea. No respiratory distress. He has no decreased breath sounds. He has wheezes (Scattered, end-expiratory). He has no rhonchi. He has no rales.   Abdominal: Soft. There is no tenderness.   Neurological: He is alert and oriented to person, place, and time. GCS eye subscore is 4. GCS verbal subscore is 5. GCS motor subscore is 6.   Skin: Skin is warm and dry. No pallor.         ED Course   Procedures  Labs Reviewed - No data to display       Imaging Results    None          Medical Decision Making:     Medical decision making:  The patient was sent to the ED from in-patient behavioral health at [facility] for evaluation of cough and shortness of breath. The patient has a history of asthma.  Workup consistent with acute bronchitis resulting in mild asthma exacerbation.  Condition improved with nebulized breathing treatments in the ED.  He is stable for discharge and return to [facility].  Treatment plan there is:    1.  Nebulized albuterol treatments every 4-6 hours for cough and wheezing.    2.  Give cough syrup to alleviate cough and chest congestion.    3.  Prednisone 40 mg daily for total of five days (first dose has been given here in the ED).                  ED Course as of Jan 26 2203   Sun Jan 26, 2020   2150 Patient reports improvement after breathing treatments but is feeling jittery. On exam: no tremor, no respiratory distress, normal heart sounds without tachycardia or murmurs, clear breath sounds without wheezes, rales, or rhonchi.    [LP]      ED Course User Index  [LP] Kasi Lowery III, MD                Clinical Impression:       ICD-10-CM ICD-9-CM   1. Acute bronchitis,  unspecified organism J20.9 466.0   2. Exacerbation of asthma, unspecified asthma severity, unspecified whether persistent J45.901 493.92         Disposition:   Disposition: Discharged  Condition: Stable                     Kasi Lowery III, MD  01/26/20 8774

## 2020-01-27 NOTE — DISCHARGE SUMMARY
[facility] Hospital Medicine  Discharge Summary      Patient Name: Adal Bower  MRN: [id number]  Admission Date: 1/25/2020  Hospital Length of Stay: 0 days  Discharge Date and Time: 1/26/2020  2:40 PM  Attending Physician:  Clive Douglas MD  Discharging Provider: Clive Douglas MD  Primary Care Provider: Primary Doctor No      HPI:   Adal Bower is a 39 y.o.  homeless male patient with a h/o asthma, depression and alcoholism who presents to the Emergency Department for evaluation of SOB which onset several days ago. Associated sxs include fever (Tnow 100.6), productive cough, wheezing, body aches, and delirium/chills secondary to alcohol withdrawl. Patient denies any diaphoresis, CP, leg swelling, n/v, and all other sxs at this time. Patient reports most recent alcoholic drink was this morning. He says that he just discharged from [facility] on 1/22/2020. Pt says he generally drinks beer and develops tremors when not drainage. He verbalized to nursing staff that he wished to slit his throat therefore, pt was PECd from the Emergency Room. A Telepsych consult was performed.  Pt's labs reflected an elevated lactic acid therefore, he was placed on Observation for medical stabilization. Vital signs on arrival: Temp 100.6, pulse 117, resp 18 - 24,  B/P 114/79 and SpO2 96 % on 2 L. CXR - no acute findings. Labs find microcytic anemia, potassium 3.4, lactate 3.1 >>> 5.2 and negative influenza.        * No surgery found *      Hospital Course:   Place an observation for evaluation and treatment of acute lactic acidosis.  Additionally expressed intent for self harm.  PEC placed by the emergency physician.  Lactic acidosis appears to be iatrogenic.  Patient had a mild elevation of lactate on admission in the setting of alcohol intoxication.  He was given fluid resuscitation for large-volume lactated ringers.  Serum lactate increased to 5.2 then decreased  in the normal range at 2.1.  This was a result of impaired lactate clearance.  He was subsequently hemodynamically stable and asymptomatic.  Discharge plan to transfer to inpatient psychiatric facility.     Consults:     No new Assessment & Plan notes have been filed under this hospital service since the last note was generated.  Service: Hospital Medicine    Final Active Diagnoses:    Diagnosis Date Noted POA    PRINCIPAL PROBLEM:  Suicidal ideation [R45.851] 01/26/2020 Not Applicable    Lactic acidosis [E87.2] 01/26/2020 Yes    Fever [R50.9] 01/26/2020 Yes    Mild asthma with exacerbation [J45.901] 01/26/2020 Yes    Depression [F32.9] 01/26/2020 Yes    Alcohol abuse [F10.10] 01/26/2020 Yes      Problems Resolved During this Admission:       Discharged Condition: good    Disposition: Psychiatric Hospital    Follow Up:    Patient Instructions:      Diet Adult Regular     Activity as tolerated       Significant Diagnostic Studies: Labs: All labs within the past 24 hours have been reviewed    Pending Diagnostic Studies:     None         Medications:  Reconciled Home Medications:      Medication List      CONTINUE taking these medications    buPROPion 150 MG TB24 tablet  Commonly known as:  WELLBUTRIN XL  Take 150 mg by mouth once daily.     busPIRone 30 MG Tab  Commonly known as:  BUSPAR  Take 30 mg by mouth 3 (three) times daily.     QUEtiapine 200 MG Tab  Commonly known as:  SEROQUEL  Take 200 mg by mouth nightly.     traZODone 100 MG tablet  Commonly known as:  DESYREL  Take 100 mg by mouth every evening.            Indwelling Lines/Drains at time of discharge:   Lines/Drains/Airways     None                 Time spent on the discharge of patient: 30 minutes  Patient was seen and examined on the date of discharge and determined to be suitable for discharge.         Clive Douglas MD  Department of Hospital Medicine  [facility]

## 2020-01-27 NOTE — DISCHARGE INSTRUCTIONS
Albuterol nebulizer treatment 2.5 mg every 4-6 hours for cough and wheezing.    Give cough medication.    Prednisone 40 mg daily for total of five days (first dose given in ED).

## 2020-01-27 NOTE — HOSPITAL COURSE
Place an observation for evaluation and treatment of acute lactic acidosis.  Additionally expressed intent for self harm.  PEC placed by the emergency physician.  Lactic acidosis appears to be iatrogenic.  Patient had a mild elevation of lactate on admission in the setting of alcohol intoxication.  He was given fluid resuscitation for large-volume lactated ringers.  Serum lactate increased to 5.2 then decreased in the normal range at 2.1.  This was a result of impaired lactate clearance.  He was subsequently hemodynamically stable and asymptomatic.  Discharge plan to transfer to inpatient psychiatric facility.

## 2020-01-30 LAB — HETEROPH AB SER QL LA: NEGATIVE

## 2020-01-31 LAB
BACTERIA BLD CULT: NORMAL
BACTERIA BLD CULT: NORMAL

## 2020-04-27 PROBLEM — Z13.9 ENCOUNTER FOR MEDICAL SCREENING EXAMINATION: Status: RESOLVED | Noted: 2020-01-27 | Resolved: 2020-04-27

## 2022-10-05 PROBLEM — S99.912A INJURY OF LEFT ANKLE: Status: ACTIVE | Noted: 2022-10-05

## 2022-10-05 PROBLEM — M25.572 PAIN AND SWELLING OF LEFT ANKLE: Status: ACTIVE | Noted: 2022-10-05

## 2022-10-05 PROBLEM — S82.892A CLOSED FRACTURE OF LEFT ANKLE: Status: ACTIVE | Noted: 2022-10-05

## 2022-10-05 PROBLEM — M25.472 PAIN AND SWELLING OF LEFT ANKLE: Status: ACTIVE | Noted: 2022-10-05

## 2022-10-14 PROBLEM — S82.892A: Status: ACTIVE | Noted: 2022-10-14

## 2022-11-28 PROBLEM — R06.02 SOB (SHORTNESS OF BREATH): Status: ACTIVE | Noted: 2022-11-28

## 2022-11-28 PROBLEM — J10.1 INFLUENZA A: Status: ACTIVE | Noted: 2022-11-28

## 2022-11-28 PROBLEM — J11.1 INFLUENZA: Status: ACTIVE | Noted: 2022-11-28

## 2022-11-28 PROBLEM — J96.01 ACUTE RESPIRATORY FAILURE WITH HYPOXIA: Status: ACTIVE | Noted: 2022-11-28

## 2022-11-29 PROBLEM — R50.9 FEVER: Status: RESOLVED | Noted: 2020-01-26 | Resolved: 2022-11-29

## 2022-11-29 PROBLEM — R06.02 SOB (SHORTNESS OF BREATH): Status: RESOLVED | Noted: 2022-11-28 | Resolved: 2022-11-29

## 2022-11-30 PROBLEM — J12.9 VIRAL PNEUMONITIS: Status: ACTIVE | Noted: 2022-11-30

## 2022-11-30 PROBLEM — R09.02 HYPOXEMIA: Status: ACTIVE | Noted: 2022-11-30

## 2022-11-30 PROBLEM — E87.6 HYPOKALEMIA: Status: ACTIVE | Noted: 2022-11-30

## 2022-12-01 PROBLEM — R09.02 HYPOXEMIA: Status: ACTIVE | Noted: 2022-12-01

## 2022-12-01 PROBLEM — E66.3 OVERWEIGHT: Status: ACTIVE | Noted: 2022-12-01

## 2022-12-01 PROBLEM — J96.01 ACUTE RESPIRATORY FAILURE WITH HYPOXIA AND HYPERCARBIA: Status: ACTIVE | Noted: 2022-11-30

## 2022-12-01 PROBLEM — F41.9 ANXIETY: Status: ACTIVE | Noted: 2022-12-01

## 2022-12-01 PROBLEM — F10.11 HISTORY OF ALCOHOL ABUSE: Status: ACTIVE | Noted: 2022-12-01

## 2022-12-01 PROBLEM — J96.02 ACUTE RESPIRATORY FAILURE WITH HYPOXIA AND HYPERCARBIA: Status: ACTIVE | Noted: 2022-11-30

## 2022-12-01 PROBLEM — D72.829 LEUKOCYTOSIS: Status: ACTIVE | Noted: 2022-12-01

## 2022-12-05 PROBLEM — R74.01 TRANSAMINITIS: Status: ACTIVE | Noted: 2022-12-05

## 2022-12-05 PROBLEM — G93.41 ENCEPHALOPATHY, METABOLIC: Status: ACTIVE | Noted: 2022-12-05

## 2022-12-05 PROBLEM — E87.6 HYPOKALEMIA: Status: RESOLVED | Noted: 2022-11-30 | Resolved: 2022-12-05

## 2022-12-07 PROBLEM — J12.9 VIRAL PNEUMONITIS: Status: RESOLVED | Noted: 2022-11-30 | Resolved: 2022-12-07

## 2022-12-07 PROBLEM — D72.829 LEUKOCYTOSIS: Status: RESOLVED | Noted: 2022-12-01 | Resolved: 2022-12-07

## 2022-12-07 PROBLEM — J96.02 ACUTE RESPIRATORY FAILURE WITH HYPOXIA AND HYPERCARBIA: Status: RESOLVED | Noted: 2022-11-30 | Resolved: 2022-12-07

## 2022-12-07 PROBLEM — J10.1 INFLUENZA A: Status: RESOLVED | Noted: 2022-11-28 | Resolved: 2022-12-07

## 2022-12-07 PROBLEM — G93.41 ENCEPHALOPATHY, METABOLIC: Status: RESOLVED | Noted: 2022-12-05 | Resolved: 2022-12-07

## 2022-12-07 PROBLEM — J96.01 ACUTE RESPIRATORY FAILURE WITH HYPOXIA AND HYPERCARBIA: Status: RESOLVED | Noted: 2022-11-30 | Resolved: 2022-12-07

## 2023-03-06 PROBLEM — J96.01 ACUTE RESPIRATORY FAILURE WITH HYPOXIA: Status: RESOLVED | Noted: 2022-11-28 | Resolved: 2023-03-06
